# Patient Record
Sex: MALE | Race: WHITE | NOT HISPANIC OR LATINO | Employment: OTHER | URBAN - NONMETROPOLITAN AREA
[De-identification: names, ages, dates, MRNs, and addresses within clinical notes are randomized per-mention and may not be internally consistent; named-entity substitution may affect disease eponyms.]

---

## 2020-02-11 ENCOUNTER — OFFICE VISIT CONVERTED (OUTPATIENT)
Dept: FAMILY MEDICINE CLINIC | Age: 60
End: 2020-02-11
Attending: FAMILY MEDICINE

## 2020-02-21 ENCOUNTER — HOSPITAL ENCOUNTER (OUTPATIENT)
Dept: OTHER | Facility: HOSPITAL | Age: 60
Discharge: HOME | End: 2020-02-21
Attending: FAMILY MEDICINE

## 2020-02-21 ENCOUNTER — CONVERSION ENCOUNTER (OUTPATIENT)
Dept: CARDIOLOGY | Facility: CLINIC | Age: 60
End: 2020-02-21
Attending: INTERNAL MEDICINE

## 2020-02-21 LAB
ALBUMIN SERPL-MCNC: 3.9 G/DL (ref 3.5–5)
ALBUMIN/GLOB SERPL: 1.1 {RATIO} (ref 1.4–2.6)
ALP SERPL-CCNC: 75 U/L (ref 56–119)
ALT SERPL-CCNC: 20 U/L (ref 10–40)
ANION GAP SERPL CALC-SCNC: 27 MMOL/L (ref 8–19)
AST SERPL-CCNC: 26 U/L (ref 15–50)
BASOPHILS # BLD MANUAL: 0.08 10*3/UL (ref 0–0.2)
BASOPHILS NFR BLD MANUAL: 0.8 % (ref 0–3)
BILIRUB SERPL-MCNC: 0.45 MG/DL (ref 0.2–1.3)
BUN SERPL-MCNC: 13 MG/DL (ref 5–25)
BUN/CREAT SERPL: 12 {RATIO} (ref 6–20)
CALCIUM SERPL-MCNC: 9.6 MG/DL (ref 8.7–10.4)
CHLORIDE SERPL-SCNC: 96 MMOL/L (ref 99–111)
CHOLEST SERPL-MCNC: 211 MG/DL (ref 107–200)
CHOLEST/HDLC SERPL: 5.9 {RATIO} (ref 3–6)
CONV CO2: 21 MMOL/L (ref 22–32)
CONV TOTAL PROTEIN: 7.3 G/DL (ref 6.3–8.2)
CREAT UR-MCNC: 1.09 MG/DL (ref 0.7–1.2)
DEPRECATED RDW RBC AUTO: 47.6 FL
EOSINOPHIL # BLD MANUAL: 0.26 10*3/UL (ref 0–0.7)
EOSINOPHIL NFR BLD MANUAL: 2.7 % (ref 0–7)
ERYTHROCYTE [DISTWIDTH] IN BLOOD BY AUTOMATED COUNT: 16.6 % (ref 11.5–14.5)
GFR SERPLBLD BASED ON 1.73 SQ M-ARVRAT: >60 ML/MIN/{1.73_M2}
GLOBULIN UR ELPH-MCNC: 3.4 G/DL (ref 2–3.5)
GLUCOSE SERPL-MCNC: 91 MG/DL (ref 70–99)
GRANS (ABSOLUTE): 6.41 10*3/UL (ref 2–8)
GRANS: 66.9 % (ref 30–85)
HBA1C MFR BLD: 14.7 G/DL (ref 14–18)
HCT VFR BLD AUTO: 46.9 % (ref 42–52)
HDLC SERPL-MCNC: 36 MG/DL (ref 40–60)
IMM GRANULOCYTES # BLD: 0.01 10*3/UL (ref 0–0.54)
IMM GRANULOCYTES NFR BLD: 0.1 % (ref 0–0.43)
LDLC SERPL CALC-MCNC: 130 MG/DL (ref 70–100)
LYMPHOCYTES # BLD MANUAL: 2.35 10*3/UL (ref 1–5)
LYMPHOCYTES NFR BLD MANUAL: 5 % (ref 3–10)
MCH RBC QN AUTO: 25.5 PG (ref 27–31)
MCHC RBC AUTO-ENTMCNC: 31.3 G/DL (ref 33–37)
MCV RBC AUTO: 81.4 FL (ref 80–96)
MONOCYTES # BLD AUTO: 0.48 10*3/UL (ref 0.2–1.2)
OSMOLALITY SERPL CALC.SUM OF ELEC: 290 MOSM/KG (ref 273–304)
PLATELET # BLD AUTO: 326 10*3/UL (ref 130–400)
PMV BLD AUTO: 8.4 FL (ref 7.4–10.4)
POTASSIUM SERPL-SCNC: 4.3 MMOL/L (ref 3.5–5.3)
PSA SERPL-MCNC: 0.59 NG/ML (ref 0–4)
RBC # BLD AUTO: 5.76 10*6/UL (ref 4.7–6.1)
SODIUM SERPL-SCNC: 140 MMOL/L (ref 135–147)
TRIGL SERPL-MCNC: 223 MG/DL (ref 40–150)
TSH SERPL-ACNC: 3.5 M[IU]/L (ref 0.27–4.2)
VARIANT LYMPHS NFR BLD MANUAL: 24.5 % (ref 20–45)
VLDLC SERPL-MCNC: 45 MG/DL (ref 5–37)
WBC # BLD AUTO: 9.59 10*3/UL (ref 4.8–10.8)

## 2020-06-02 ENCOUNTER — OFFICE VISIT CONVERTED (OUTPATIENT)
Dept: FAMILY MEDICINE CLINIC | Age: 60
End: 2020-06-02
Attending: FAMILY MEDICINE

## 2020-10-28 ENCOUNTER — HOSPITAL ENCOUNTER (OUTPATIENT)
Dept: OTHER | Facility: HOSPITAL | Age: 60
Discharge: HOME | End: 2020-10-28
Attending: FAMILY MEDICINE

## 2020-10-30 LAB — SARS-COV-2 RNA SPEC QL NAA+PROBE: NOT DETECTED

## 2020-12-01 ENCOUNTER — OFFICE VISIT CONVERTED (OUTPATIENT)
Dept: FAMILY MEDICINE CLINIC | Age: 60
End: 2020-12-01
Attending: FAMILY MEDICINE

## 2020-12-29 ENCOUNTER — OFFICE VISIT CONVERTED (OUTPATIENT)
Dept: FAMILY MEDICINE CLINIC | Age: 60
End: 2020-12-29
Attending: FAMILY MEDICINE

## 2021-05-18 NOTE — PROGRESS NOTES
Rusty Benitez  1960     Office/Outpatient Visit    Visit Date: Tue, Dec 29, 2020 09:01 am    Provider: Jonh Mir MD (Assistant: Hedy Titus MA)    Location: [facility]        Electronically signed by oJnh Mir MD on  12/29/2020 10:47:29 AM                             Subjective:        CC: [phone] - DOX VIDEO NOT TAKING BREO, PT STATES IT WAS TOO EXPENSIVE, ONLY TAKING CYMBALTA ONCE A DAYStera is a 60 year old White male.  This is a follow-up visit.  discuss medication, wants to increase dosage of cymbalta, once a day not working Today's encounter is being done with a telehealth visit. He has consented verbally with two witnesses for todays treatment. Todays visit is being conducted by audio and video. Individuals present during the telemedicine consultation include patient and Dr. Mir         HPI:       Pertaining to depression, at last visit, Jimmie was started on Cymbalta 30 mg daily. He reports that this medication has been helpful. However, he has continued to struggle intermittently with irritability and wonders if he can increase his dose..  He has previously tried and failed celexa and wellbutrin. He denies SI or HI.          Pertaining to COPD, Jimmie reports that his symptoms are stable.  His current regimen is  albuterol as needed. He was prescribed breo ellipta at last visit and was prescribed symbicort in the past but is not taking either of these due to prohibitive cost.  Today, he endorses baseline shortness of breath and nonproductive cough. He has no fever or chills.  He continues to smoke.              With regard to the hyperlipidemia, unspecified, current treatment includes a low cholesterol/low fat diet.  Compliance with treatment has been good; he maintains his low cholesterol diet and follows up as directed.  He denies experiencing any hypercholesterolemia related symptoms.  Most recent lab tests include Total Cholesterol:  211 (mg/dL)  "(02/21/2020), HDL:  36 (mg/dL) (02/21/2020), Triglycerides:  223 (mg/dL) (02/21/2020), LDL:  130 (mg/dL) (02/21/2020).      ROS:     CONSTITUTIONAL:  Positive for fatigue.   Negative for chills or fever.      EYES:  Negative for blurred vision.      CARDIOVASCULAR:  Negative for chest pain, dizziness, palpitations and edema.      RESPIRATORY:  Positive for dyspnea, sleep apnea and cough.      GASTROINTESTINAL:  Negative for abdominal pain, constipation, diarrhea, heartburn, nausea and vomiting.      INTEGUMENTARY:  Negative for rash.      NEUROLOGICAL:  Negative for headaches, paresthesias and weakness.      PSYCHIATRIC:  Positive for depression, anhedonia and sleep disturbance.   Negative for suicidal thoughts.          Past Medical History / Family History / Social History:         Last Reviewed on 12/29/2020 10:47 AM by Jonh Mir    Past Medical History:             PAST MEDICAL HISTORY         Positive for    COPD and    Sleep Apnea;     Positive for    Depression;         PREVENTIVE HEALTH MAINTENANCE             BONE DENSITY: has never been done     COLORECTAL CANCER SCREENING: declines colorectal cancer screening, understands reason for testing     PSA: was last done 02- with normal results         Surgical History:         Positive for    Cholecystectomy;     Positive for    left meniscal repair; and    \"triple P' sinus surgery;;         Family History:         Positive for Pancreatric Cancer;     Positive for Depression;         Social History:     Occupation:    Disabled     Marital Status:      Children: 1 child         Tobacco/Alcohol/Supplements:     Last Reviewed on 12/29/2020 10:47 AM by Jonh Mir    Tobacco: Current Smoker: He currently smokes every day, 1 pack per day.          Substance Abuse History:     Last Reviewed on 12/29/2020 10:47 AM by Jonh Mir    None         Mental Health History:     Last Reviewed on 12/29/2020 10:47 AM by Jonh Mir        Major Depression " "        Communicable Diseases (eg STDs):     Last Reviewed on 12/29/2020 10:47 AM by Jonh Mir    Reportable health conditions; NEGATIVE         Current Problems:     Last Reviewed on 12/29/2020 10:47 AM by Jonh Mir    Major depressive disorder, recurrent, unspecified    Sleep apnea, unspecified    Chronic obstructive pulmonary disease, unspecified    Dyspnea, unspecified    Encounter for screening for malignant neoplasm of colon    Encounter for screening for malignant neoplasm of prostate    Encounter for screening for depression    Localized edema    Shortness of breath    Encounter for general adult medical examination without abnormal findings    Hyperlipidemia, unspecified    Encounter for immunization        Immunizations:     influenza, injectable, quadrivalent, preservative free (FLUZONE QUAD [id number]) 10/8/2020        Allergies:     Last Reviewed on 12/29/2020 10:47 AM by Jonh Mir    No Known Allergies.        Current Medications:     Last Reviewed on 12/29/2020 10:47 AM by Jonh Mir    No Known Medications.    albuterol sulfate 90 mcg/actuation Inhalation HFA Aerosol Inhaler [inhale 1 - 2 puffs (90 - 180 mcg) by inhalation route every 4 hours as needed]    furosemide 40 mg oral tablet [TAKE 1 TABLET(40 MG) BY MOUTH DAILY]    diclofenac sodium 75 mg oral tablet, delayed release (enteric coated) [take 1 tablet (75 mg) by oral route 2 times per day]    DULoxetine 30 mg oral capsule,delayed release (enteric coated) [take 1 capsule (30 mg) by oral route 2 times per day]    Breo Ellipta 200-25 mcg/dose Inhalation Blister, With Inhalation Device [inhale 1 puff by inhalation route once daily ]        Objective:        Exams:     PHYSICAL EXAM:     GENERAL: Vitals recorded well developed, well nourished;  no apparent distress;     EYES: conjunctiva and cornea are normal;     RESPIRATORY: normal respiratory rate and pattern with no distress; Clear to auscultation bilateally; no rales (\"crackles\") " present; no rhonchi; no wheezes;     SKIN:  No significant rashes, lesions or suspicious moles within limits of examination;     NEUROLOGIC: Grossly intact; mental status: alert and oriented x 3;     PSYCHIATRIC: appropriate affect and demeanor; normal speech pattern; Normal behavior;         Assessment:         F33.9   Major depressive disorder, recurrent, unspecified       J44.9   Chronic obstructive pulmonary disease, unspecified       E78.5   Hyperlipidemia, unspecified           ORDERS:         Meds Prescribed:       [Refilled] DULoxetine 60 mg oral capsule,delayed release (enteric coated) [take 1 capsule (60 mg) by oral route once daily], #30 (thirty) capsules, Refills: 1 (one)                 Plan:         Major depressive disorder, recurrent, unspecified- Improved but not completely controlled.  Increase Cymbalta to 60 mg daily.  Return to clinic in 4 weeks.              Prescriptions:       [Refilled] DULoxetine 60 mg oral capsule,delayed release (enteric coated) [take 1 capsule (60 mg) by oral route once daily], #30 (thirty) capsules, Refills: 1 (one)         Chronic obstructive pulmonary disease, unspecified- Stable.  Continue albuterol as needed.  Advised patient to contact insurance provider to obtain guidance on which ICS and LABA would be covered.         Hyperlipidemia, unspecified- Not controlled.  Patient has previously declined medications.  Repeat lipid panel ordered today.            Charge Capture:         Primary Diagnosis:     F33.9  Major depressive disorder, recurrent, unspecified           Orders:      31354  Office/outpatient visit; established patient, level 4  (In-House)              J44.9  Chronic obstructive pulmonary disease, unspecified     E78.5  Hyperlipidemia, unspecified

## 2021-05-18 NOTE — PROGRESS NOTES
"Rusty Benitez  1960     Office/Outpatient Visit    Visit Date: Tue, Dec 1, 2020 01:53 pm    Provider: Jonh Mir MD (Assistant: Mira Lopez MA)    Location: [facility]        Electronically signed by Jonh Mir MD on  12/01/2020 06:48:29 PM                             Subjective:        CC: Jimmie is a 59 year old White male.  Follow up with medicaiton refills. [facility] [phone] Today's encounter is being done with a telehealth visit. He has consented verbally with two witnesses for todays treatment. Todays visit is being conducted by audio and video. Individuals present during the telemedicine consultation include patient and Dr. Mir         HPI:       Pertaining to depression, At last visit, Jimmie was started on wellbutrin 150 mg daily. He reports that this medication has been unhelpful. He reports that he continues to struggle with poor mood, anhedonia, and fatigue.  He has previously tried and failed celexa. He denies SI or HI.      Pertaining to sleep apnea, patient reports that he was told that he needed positive pressure airway in the past but he could not tolerate the mask.  After an ENT evaluation he had a \"triple P\" sinus surgery that he feels was ultimately unsuccessful.  He continues to endorse daytime sleepiness and fatigue.  Stop bang score is 7. However, at a previous visit, he declined referral for repeat sleep study and continues to do so today.      Pertaining to COPD, patient reports that his symptoms are stable.  His current regimen is symbicort and albuterol as needed. However, he reports that he has not had symbicort recently due to cost.  Today, he endorses baseline shortness of breath and nonproductive cough he has no fever or chills.  He continues to smoke.          Dx with hyperlipidemia, unspecified; current treatment includes a low cholesterol/low fat diet.  Compliance with treatment has been good; he maintains his low cholesterol diet and follows " "up as directed.  He denies experiencing any hypercholesterolemia related symptoms.  Most recent lab tests include Total Cholesterol:  211 (mg/dL) (02/21/2020), HDL:  36 (mg/dL) (02/21/2020), Triglycerides:  223 (mg/dL) (02/21/2020), LDL:  130 (mg/dL) (02/21/2020).      ROS:     CONSTITUTIONAL:  Positive for fatigue.   Negative for chills or fever.      EYES:  Negative for blurred vision.      CARDIOVASCULAR:  Negative for chest pain, dizziness, palpitations and edema.      RESPIRATORY:  Positive for dyspnea, sleep apnea and cough.      GASTROINTESTINAL:  Negative for abdominal pain, constipation, diarrhea, heartburn, nausea and vomiting.      INTEGUMENTARY:  Negative for rash.      NEUROLOGICAL:  Negative for headaches, paresthesias and weakness.      PSYCHIATRIC:  Positive for depression, anhedonia and sleep disturbance.   Negative for suicidal thoughts.          Past Medical History / Family History / Social History:         Last Reviewed on 12/01/2020 06:48 PM by Jonh Mir    Past Medical History:             PAST MEDICAL HISTORY         Positive for    COPD and    Sleep Apnea;     Positive for    Depression;         PREVENTIVE HEALTH MAINTENANCE             BONE DENSITY: has never been done     COLORECTAL CANCER SCREENING: declines colorectal cancer screening, understands reason for testing     PSA: was last done 02- with normal results         Surgical History:         Positive for    Cholecystectomy;     Positive for    left meniscal repair; and    \"triple P' sinus surgery;;         Family History:         Positive for Pancreatric Cancer;     Positive for Depression;         Social History:     Occupation:    Disabled     Marital Status:      Children: 1 child         Tobacco/Alcohol/Supplements:     Last Reviewed on 12/01/2020 06:48 PM by Jonh Mir    Tobacco: Current Smoker: He currently smokes every day, 1 pack per day.          Substance Abuse History:     Last Reviewed on 12/01/2020 " 06:48 PM by Jonh Mir    None         Mental Health History:     Last Reviewed on 12/01/2020 06:48 PM by Jonh Mir        Major Depression         Communicable Diseases (eg STDs):     Last Reviewed on 12/01/2020 06:48 PM by Jonh Mir    Reportable health conditions; NEGATIVE         Current Problems:     Last Reviewed on 12/01/2020 06:48 PM by Jonh Mir    Major depressive disorder, recurrent, unspecified    Sleep apnea, unspecified    Chronic obstructive pulmonary disease, unspecified    Dyspnea, unspecified    Encounter for screening for malignant neoplasm of colon    Encounter for screening for malignant neoplasm of prostate    Encounter for screening for depression    Localized edema    Shortness of breath    Encounter for general adult medical examination without abnormal findings    Hyperlipidemia, unspecified    Encounter for immunization        Immunizations:     influenza, injectable, quadrivalent, preservative free (FLUZONE QUAD [id number]) 10/8/2020        Allergies:     Last Reviewed on 12/01/2020 06:48 PM by Jonh Mir    No Known Allergies.        Current Medications:     Last Reviewed on 12/01/2020 06:48 PM by Jonh Mir    No Known Medications.    albuterol sulfate 90 mcg/actuation Inhalation HFA Aerosol Inhaler [inhale 1 - 2 puffs (90 - 180 mcg) by inhalation route every 4 hours as needed]    furosemide 40 mg oral tablet [TAKE 1 TABLET(40 MG) BY MOUTH DAILY]    diclofenac sodium 75 mg oral tablet, delayed release (enteric coated) [take 1 tablet (75 mg) by oral route 2 times per day]    budesonide-formoterol 160-4.5 mcg/actuation Inhalation HFA Aerosol Inhaler [inhale 1 puffs by inhalation route 2 times per day in the morning andevening]        Objective:        Exams:     PHYSICAL EXAM:     GENERAL: Vitals recorded well developed, well nourished;  no apparent distress;     EYES: conjunctiva and cornea are normal;     RESPIRATORY: normal respiratory rate and pattern with no  distress;     SKIN:  No significant rashes, lesions or suspicious moles within limits of examination;     NEUROLOGIC: Grossly intact; mental status: alert and oriented x 3;     PSYCHIATRIC: appropriate affect and demeanor; normal speech pattern; Normal behavior;         Assessment:         F33.9   Major depressive disorder, recurrent, unspecified       G47.30   Sleep apnea, unspecified       J44.9   Chronic obstructive pulmonary disease, unspecified       E78.5   Hyperlipidemia, unspecified           ORDERS:         Meds Prescribed:       [New Rx] DULoxetine 30 mg oral capsule,delayed release (enteric coated) [take 1 capsule (30 mg) by oral route 2 times per day], #30 (thirty) capsules, Refills: 1 (one)         Lab Orders:       [id number]  [facility] Comp. Metabolic Panel  (Send-Out)            [id number]  [facility] Lipid Panel  (Send-Out)                      Plan:         Major depressive disorder, recurrent, unspecified- Not controlled.  Discontinue Wellbutrin due to lack of efficacy.  Start Cymbalta 30 mg daily.  Return to clinic in 4 weeks.          Prescriptions:       [New Rx] DULoxetine 30 mg oral capsule,delayed release (enteric coated) [take 1 capsule (30 mg) by oral route 2 times per day], #30 (thirty) capsules, Refills: 1 (one)         Sleep apnea, unspecified-High risk for obstructive sleep apnea given STOP-BANG score of 7.  Jimmie continues to decline referral for sleep study.        Chronic obstructive pulmonary disease, unspecified- Stable.  Continue albuterol as needed.  Will attempt to restart Symbicort as patient notes that he is near his deductible and this should be avoided.  However,if  the cost remains prohibitive will start equivalent medication such as Breo Ellipta.        Hyperlipidemia, unspecified- Not controlled.  Patient has previously declined medications.  Repeat lipid panel ordered today.    LABORATORY:  Labs ordered to be performed today include Comprehensive metabolic panel and lipid panel.             Orders:       [id number]  [facility] Comp. Metabolic Panel  (Send-Out)            [id number]  [facility] Lipid Panel  (Send-Out)                  Charge Capture:         Primary Diagnosis:     F33.9  Major depressive disorder, recurrent, unspecified           Orders:      [id number]  Office/outpatient visit; established patient, level 4  (In-House)              G47.30  Sleep apnea, unspecified     J44.9  Chronic obstructive pulmonary disease, unspecified     E78.5  Hyperlipidemia, unspecified         ADDENDUMS:      ____________________________________    Addendum: 12/02/2020 10:49 AM - Mira Hollingsworth        patient informed that the instructions default to BID but he to take medication, Duloxetine/Cymbalta 30mg ONCE per day. Pt voiced understanding./bb

## 2021-05-18 NOTE — PROGRESS NOTES
"Rusty Benitez  1960     Office/Outpatient Visit    Visit Date: Tue, Feb 11, 2020 11:34 am    Provider: Jonh Mir MD (Assistant: Spurling, Sarah C, MA)    Location: [facility]        Electronically signed by Jonh Mir MD on  02/11/2020 06:49:16 PM                             Subjective:        CC: Jimmie is a 59 year old White male.  This is his first visit to the clinic.  est. care. & he is here for both of his feet swelling, and for his COPD. He is currently not on any medications.  *WANTS HIS FLU SHOT AS LONG AS MEDICARE WILL PAY FOR IT**         HPI:           PHQ-9 Depression Screening: Completed form scanned and in chart; Total Score 21       Patient reports that for approximately the last 2 months he has been experiencing bilateral lower extremity edema.  He believes this is gradually worsening.  He has no known history of congestive heart failure, blood clots or lymphedema.  No prior history of trauma to either leg.  He has been trying to keep his legs elevated and minimize long periods of time spent on his feet.  He denies chest pain, palpitations, orthopnea.  He does endorse shortness of breath that he said has been present for longer than his edema.      Patient reports a past medical history of depression for which he previously was on medication.  He is not certain of the name of this medication.  He said that he stopped taking it several months ago due to lack of insurance coverage.  Today, he says that he thinks he needs to be restarted on a medication as his mood is been very down.  He also endorses anhedonia fatigue and feelings of guilt or worthlessness.  He denies suicidal or homicidal ideation.      Patient reports that he was diagnosed with sleep apnea in the past.  He was told that he would need positive pressure airway therapy but he says that he could not tolerate the mask.  He said he was evaluated by an ENT who performed a \"triple P\" surgery help to " "improve sleep apnea.  He says he feels like the surgery was unsuccessful and possibly even botched.  He endorses consistent daytime sleepiness and fatigue.  He also reports that he has multiple nighttime awakenings.  Stop bang score is 7.      Patient reports a longstanding history of COPD.  He said he has had pulmonary function testing but cannot remember when or where.  He does not follow with pulmonology.  He has not been on medication \"for a while.\"  His previous regimen includes Symbicort daily and albuterol as needed.  He endorses chronic shortness of breath and nonproductive cough.  He becomes short of breath with more than mild exertion. No fever or chills.  Bilateral lower extremity edema as noted above.      Patient is overdue for colon cancer screening and is willing to be referred for colonoscopy today      Patient is overdue for prostate cancer screening and is willing to have a PSA drawn today.    ROS:     CONSTITUTIONAL:  Positive for fatigue.   Negative for chills or fever.      EYES:  Negative for blurred vision.      E/N/T:  Negative for ear pain and tinnitus.      CARDIOVASCULAR:  Positive for edema.   Negative for chest pain, dizziness or palpitations.      RESPIRATORY:  Positive for dyspnea, sleep apnea and cough.      GASTROINTESTINAL:  Negative for abdominal pain, constipation, diarrhea, heartburn, nausea and vomiting.      GENITOURINARY:  Negative for dysuria, hematuria and polyuria.      MUSCULOSKELETAL:  Negative for arthralgias and myalgias.      INTEGUMENTARY:  Negative for rash.      NEUROLOGICAL:  Negative for headaches, paresthesias and weakness.      HEMATOLOGIC/LYMPHATIC:  Negative for easy bruising and excessive bleeding.      ENDOCRINE:  Negative for hair loss, heat/cold intolerance, polydipsia, and polyphagia.      PSYCHIATRIC:  Positive for depression, anhedonia and sleep disturbance.   Negative for suicidal thoughts.          Past Medical History / Family History / Social " "History:         Last Reviewed on 2/11/2020 06:48 PM by Jonh Mir    Past Medical History:             PAST MEDICAL HISTORY         Positive for    COPD and    Sleep Apnea;     Positive for    Depression;         Surgical History:         Positive for    Cholecystectomy;     Positive for    left meniscal repair; and    \"triple P' sinus surgery;;         Family History:         Positive for Pancreatric Cancer;     Positive for Depression;         Social History:     Occupation:    Disabled     Marital Status:      Children: 1 child         Tobacco/Alcohol/Supplements:     Last Reviewed on 2/11/2020 06:48 PM by Jonh Mir    Tobacco: Current Smoker: He currently smokes every day, 1 pack per day.          Substance Abuse History:     Last Reviewed on 2/11/2020 06:48 PM by Jonh Mir    None         Mental Health History:     Last Reviewed on 2/11/2020 06:48 PM by Jonh Mir        Major Depression         Communicable Diseases (eg STDs):     Last Reviewed on 2/11/2020 06:48 PM by Jonh Mir    Reportable health conditions; NEGATIVE         Current Problems:     Last Reviewed on 2/11/2020 06:48 PM by Jonh Mir    Major depressive disorder, recurrent, unspecified    Sleep apnea, unspecified    Chronic obstructive pulmonary disease, unspecified    Dyspnea, unspecified    Localized edema    Encounter for screening for malignant neoplasm of colon    Encounter for screening for malignant neoplasm of prostate    Encounter for screening for depression        Immunizations:     None        Allergies:     Last Reviewed on 2/11/2020 06:48 PM by Jonh Mir    No Known Allergies.        Current Medications:     Last Reviewed on 2/11/2020 06:48 PM by Jonh Mir    No Known Medications.    furosemide 40 mg oral tablet [take 1 tablet (40 mg) by oral route daily]    budesonide-formoterol 160-4.5 mcg/actuation Inhalation HFA Aerosol Inhaler [inhale 1 puffs by inhalation route 2 times per day in the " "morning andevening]    albuterol sulfate 90 mcg/actuation Inhalation HFA Aerosol Inhaler [inhale 1 - 2 puffs (90 - 180 mcg) by inhalation route every 4 hours as needed]        Objective:        Vitals:         Current: 2/11/2020 11:40:51 AM    Ht:  6 ft, 0 in;  Wt: 325.2 lbs;  BMI: 44.1T: 98.6 F (oral);  BP: 130/69 mm Hg (left arm, sitting);  P: 90 bpm (left arm (BP Cuff), sitting)O2 Sat: 96 % (room air)        Exams:     PHYSICAL EXAM:     GENERAL: Vitals recorded well developed, well nourished;  no apparent distress;     EYES: conjunctiva and cornea are normal;     E/N/T:  normal EACs, TMs, nasal/oral mucosa, teeth, gingiva, and oropharynx;     NECK: trachea is midline; thyroid is non-palpable;     RESPIRATORY: Clear to auscultation bilateally; no rales (\"crackles\") present; no rhonchi; no wheezes;     CARDIOVASCULAR: normal rate; rhythm is regular;  No murmurs, clicks, gallops or rubs appreciated; 2+ pedal edema;     GASTROINTESTINAL: nontender; Soft and nondistended; normal bowel sounds; no organomegaly; no masses;     LYMPHATIC: no enlargement of cervical or facial nodes; no supraclavicular nodes;     SKIN:  No significant rashes, lesions or suspicious moles within limits of examination;     MUSCULOSKELETAL: muscle strength: 5/5 in all major muscle groups;  normal overall tone     NEUROLOGIC: Grossly intact; mental status: alert and oriented x 3;     PSYCHIATRIC: appropriate affect and demeanor; normal speech pattern; Normal behavior;         Assessment:         R60.0   Localized edema       F33.9   Major depressive disorder, recurrent, unspecified       G47.30   Sleep apnea, unspecified       J44.9   Chronic obstructive pulmonary disease, unspecified       Z12.11   Encounter for screening for malignant neoplasm of colon       Z12.5   Encounter for screening for malignant neoplasm of prostate       R06.00   Dyspnea, unspecified       Z13.31   Encounter for screening for depression           ORDERS:         " Radiology/Test Orders:       [id number]  Echocardiography, transthoracic, real-time w image (2D), w M-mode, w spectral & color flow Doppler  (Send-Out)              Lab Orders:       [id number]  [facility] Comp. Metabolic Panel  (Send-Out)            [id number]  [facility] Lipid Panel  (Send-Out)            [id number]  [facility] TSH  (Send-Out)            [id number]  [facility]  (Send-Out)            *  [employer] screening PSA  (Send-Out)              Procedures Ordered:       REFER  Referral to Specialist or Other Facility  (Send-Out)              Other Orders:         Depression screen positive and follow up plan documented  (In-House)                      Plan:         Localized edema- Venous stasis versus lymphedema versus hypoproteinemia versus congestive heart failure.  Will order an echo for further evaluation.  Labs also ordered including CBC, lipid panel and TSH.  Will start Lasix 40 mg daily. Advised patient to obtain a pair of compression stockings and wear them as often as tolerated.  Keep legs elevated as much as possible.  If symptoms persist, will consider increasing diuretic dose as allowed and/or referral for lymphedema wraps.    LABORATORY:  Labs ordered to be performed today include Comprehensive metabolic panel, lipid panel, and TSH.      RADIOLOGY:  I have ordered ECHO to be done today.            Orders:       [id number]  [facility] Comp. Metabolic Panel  (Send-Out)            [id number]  [facility] Lipid Panel  (Send-Out)            [id number]  [facility] TSH  (Send-Out)            [id number]  Echocardiography, transthoracic, real-time w image (2D), w M-mode, w spectral & color flow Doppler  (Send-Out)              Major depressive disorder, recurrent, unspecified- Not controlled.  Start Celexa.  Take 10 mg daily for 1 week then increase to 20 mg daily.  Return to clinic in 4 weeks for mood med check        Sleep apnea, unspecified- Stop bang score of 7 is consistent with high risk of obstructive sleep  apnea.  Patient was previously diagnosed and had positive airway therapy recommended.  He declines referral for a repeat sleep study today.  He would like to see if treating his depression helps his sleep any.  If not, will readdress referral for sleep study at next visit.    LABORATORY:  Labs ordered to be performed today include CBC.            Orders:       [id number]  [facility] CBC with 3 part diff  (Send-Out)              Chronic obstructive pulmonary disease, unspecified- Not controlled.  Will restart albuterol as needed and Symbicort BID. Patient will also likely need repeat PFTs in the near future.        Encounter for screening for malignant neoplasm of colon- Colonoscopy referral placed        REFERRALS:  Referral initiated to a general surgeon ( a colonoscopy ).            Orders:       REFER  Referral to Specialist or Other Facility  (Send-Out)              Encounter for screening for malignant neoplasm of prostate- PSA ordered    LABORATORY:  Labs ordered to be performed today include PSA.            Orders:       *  [employer] screening PSA  (Send-Out)              Dyspnea, unspecified- See COPD and edema plans above        Encounter for screening for depression    MIPS PHQ-9 Depression Screening: Completed form scanned and in chart; Total Score 21 Positive Depression Screen: Pharmacologic intervention initiated/modified           Orders:         Depression screen positive and follow up plan documented  (In-House)                  Charge Capture:         Primary Diagnosis:     R60.0  Localized edema           Orders:      [id number]  Office visit - new pt, level 3  (In-House)              F33.9  Major depressive disorder, recurrent, unspecified     G47.30  Sleep apnea, unspecified     J44.9  Chronic obstructive pulmonary disease, unspecified     Z12.11  Encounter for screening for malignant neoplasm of colon     Z12.5  Encounter for screening for malignant neoplasm of prostate     R06.00   Dyspnea, unspecified     Z13.31  Encounter for screening for depression           Orders:        Depression screen positive and follow up plan documented  (In-House)

## 2021-05-18 NOTE — PROGRESS NOTES
Rusty Benitez  1960     Office/Outpatient Visit    Visit Date: Tue, Jun 2, 2020 12:59 pm    Provider: Jonh Mir MD (Assistant: Andreina Powell MA)    Location: [facility]        Electronically signed by Jonh Mir MD on  08/19/2020 07:33:18 PM                             Subjective:        CC: Jimmie is a 59 year old White male.  Today's encounter is being done with a telehealth visit. He has consented verbally with two witnesses for todays treatment. Todays visit is being conducted by audio and video. Individuals present during the telemedicine consultation include patient an Dr. Mir         HPI:           Jimmie is here for a Medicare wellness visit.          Self-Assessment of Health: He rates his health as poor. He rates his confidence of being able to control/manage most of his health problems as not very confident. His physical/emotional health has limited his social activites extremely.  A review of possible cognitive impairment was performed and the following was noted: he is not having trouble driving;  memory changes are noted;  he is not having trouble with his finances A review of functional ability, including bathing, dressing, walking, and urine/bowel continence as well as level of safety was performed and was found to be negative.  Falls Risk: Has not had any falls or only one fall without injury in the past year.  In regard to hearing, he reports having trouble hearing the TV/radio when others do not and having to strain to hear or understand conversations, but not wearing hearing aid(s).  Concerning home safety, he reports that at home he DOES have adequate lighting, a skid resistant shower/tub, handrails on stairs and functioning smoke alarms, but not grab bars in the bath or absence of throw rugs.  Physical Activity: He exercises but less than 20 minutes 3 days per week; He never excercises.; Type of diet patient normally eats is described as well-balanced with  "fruits and vegetables Tobacco: Current Smoker: He currently smokes every day, 1 pack per day.  Preventative Health updated today.            PHQ-9 Depression Screening: Completed form scanned and in chart; Total Score 21       Pertaining to depression, Jimmie reports that he continues to struggle with poor mood, anhedonia, fatigue and issues sleeping.  At last visit he was started on Celexa 10 mg daily with instructions to increase to 20 mg after 1 week.  He has since discontinued this medicine citing that he does not like the way that it makes him feel. He denies SI or HI.      Pertaining to sleep apnea, patient reports that he was told that he needed positive pressure airway in the past but he could not tolerate the mask.  After an ENT evaluation he had a \"triple P\" sinus surgery that he feels was ultimately unsuccessful.  He continues to endorse daytime sleepiness and fatigue.  Stop bang score is 7 at last visit, he declined referral for repeat sleep study and continues to do so today.      Pertaining to COPD, patient reports that his symptoms are stable.  He was restarted on Symbicort and albuterol as needed at last visit.  Today, he endorses baseline shortness of breath and nonproductive cough he has no fever or chills.  He continues to smoke.      After last visit, Jimmie was found to have high cholesterol.  It was recommended that he start a statin.  He reports that this message never made to him.  Today, he reports that he would like to have his lipid panel repeated And would like to avoid starting a new medication if at all possible.    ROS:     CONSTITUTIONAL:  Positive for fatigue.   Negative for chills or fever.      EYES:  Negative for blurred vision.      E/N/T:  Negative for ear pain and tinnitus.      CARDIOVASCULAR:  Negative for chest pain, dizziness, palpitations and edema.      RESPIRATORY:  Positive for dyspnea, sleep apnea and cough.      GASTROINTESTINAL:  Negative for abdominal pain, " "constipation, diarrhea, heartburn, nausea and vomiting.      GENITOURINARY:  Negative for dysuria, hematuria and polyuria.      MUSCULOSKELETAL:  Negative for arthralgias and myalgias.      INTEGUMENTARY:  Negative for rash.      NEUROLOGICAL:  Negative for headaches, paresthesias and weakness.      HEMATOLOGIC/LYMPHATIC:  Negative for easy bruising and excessive bleeding.      ENDOCRINE:  Negative for hair loss, heat/cold intolerance, polydipsia, and polyphagia.      PSYCHIATRIC:  Positive for depression, anhedonia and sleep disturbance.   Negative for suicidal thoughts.          Past Medical History / Family History / Social History:         Last Reviewed on 8/19/2020 07:32 PM by Jonh Mir    Past Medical History:             PAST MEDICAL HISTORY         Positive for    COPD and    Sleep Apnea;     Positive for    Depression;         PREVENTIVE HEALTH MAINTENANCE             BONE DENSITY: has never been done     COLORECTAL CANCER SCREENING: declines colorectal cancer screening, understands reason for testing     PSA: was last done 02- with normal results         Surgical History:         Positive for    Cholecystectomy;     Positive for    left meniscal repair; and    \"triple P' sinus surgery;;         Family History:         Positive for Pancreatric Cancer;     Positive for Depression;         Social History:     Occupation:    Disabled     Marital Status:      Children: 1 child         Tobacco/Alcohol/Supplements:     Last Reviewed on 8/19/2020 07:32 PM by Jonh Mir    Tobacco: Current Smoker: He currently smokes every day, 1 pack per day.          Substance Abuse History:     Last Reviewed on 8/19/2020 07:32 PM by Jonh Mir    None         Mental Health History:     Last Reviewed on 8/19/2020 07:32 PM by Jonh Mir        Major Depression         Communicable Diseases (eg STDs):     Last Reviewed on 8/19/2020 07:32 PM by Jonh Mir    Reportable health conditions; NEGATIVE        "  Current Problems:     Last Reviewed on 8/19/2020 07:32 PM by Jonh Mir    Major depressive disorder, recurrent, unspecified    Sleep apnea, unspecified    Chronic obstructive pulmonary disease, unspecified    Dyspnea, unspecified    Localized edema    Encounter for screening for malignant neoplasm of colon    Encounter for screening for malignant neoplasm of prostate    Encounter for screening for depression    Hyperlipidemia, unspecified    Encounter for general adult medical examination without abnormal findings        Immunizations:     None        Allergies:     Last Reviewed on 8/19/2020 07:32 PM by Jonh Mir    No Known Allergies.        Current Medications:     Last Reviewed on 8/19/2020 07:32 PM by Jonh Mir    No Known Medications.    budesonide-formoterol 160-4.5 mcg/actuation Inhalation HFA Aerosol Inhaler [inhale 1 puffs by inhalation route 2 times per day in the morning andevening]    albuterol sulfate 90 mcg/actuation Inhalation HFA Aerosol Inhaler [inhale 1 - 2 puffs (90 - 180 mcg) by inhalation route every 4 hours as needed]    furosemide 40 mg oral tablet [TAKE 1 TABLET(40 MG) BY MOUTH DAILY]    citalopram 20 mg oral tablet [TAKE 1 TABLET(20 MG) BY MOUTH EVERY DAY]    buPROPion  mg oral Tablet, Extended Release 24 hr [TAKE 1 TABLET(150 MG) BY MOUTH TWICE DAILY]        Objective:        Exams:     PHYSICAL EXAM:     GENERAL: Vitals recorded well developed, well nourished;  no apparent distress;     EYES: conjunctiva and cornea are normal;     RESPIRATORY: normal respiratory rate and pattern with no distress;     SKIN:  No significant rashes, lesions or suspicious moles within limits of examination;     NEUROLOGIC: mental status: alert and oriented x 3; Grossly intact;     PSYCHIATRIC: appropriate affect and demeanor; normal speech pattern; Normal behavior;         Assessment:         Z00.00   Encounter for general adult medical examination without abnormal findings       Z13.31    Encounter for screening for depression       F33.9   Major depressive disorder, recurrent, unspecified       G47.30   Sleep apnea, unspecified       J44.9   Chronic obstructive pulmonary disease, unspecified       E78.5   Hyperlipidemia, unspecified           ORDERS:         Meds Prescribed:       [New Rx] buPROPion  mg oral Tablet, Extended Release 24 hr [take 1 tablet (150 mg) by oral route twice daily], #60 (sixty) tablets, Refills: 1 (one)         Lab Orders:       FUTURE  Future order to be done at patients convenience  (Send-Out)            54052  [facility] Lipid Panel  (Send-Out)              Procedures Ordered:         Annual wellness visit, includes a PPPS, subsequent visit  (In-House)              Other Orders:       1101F  Pt screen for fall risk; document no falls in past year or only 1 fall w/o injury in past year (MARE)  (In-House)            1124F  Advance Care Planning discussed and doc in MR; no surrogate named or advance care plan provided  (Send-Out)              Depression screen positive and follow up plan documented  (In-House)                      Plan:         Encounter for general adult medical examination without abnormal findings- Appropriate screenings and vaccinations were reviewed with the patient and offered as indicated.  Patient counseled on healthy lifestyle including balanced diet and adequate physical activity.    MIPS Has had no falls or only one fall without injury in the past year Advance Directive/Surrogate Decision Maker discussed and pt declines to complete today  Smoking cessation encouraged. Counseling for less than 3 minutes.  ADVANCED DIRECTIVES: None               Orders:       1101F  Pt screen for fall risk; document no falls in past year or only 1 fall w/o injury in past year (MARE)  (In-House)            1124F  Advance Care Planning discussed and doc in MR; no surrogate named or advance care plan provided  (Send-Out)              Annual wellness  visit, includes a PPPS, subsequent visit  (In-House)              Encounter for screening for depression    MIPS PHQ-9 Depression Screening: Completed form scanned and in chart; Total Score 21 Positive Depression Screen: Pharmacologic intervention initiated/modified           Orders:         Depression screen positive and follow up plan documented  (In-House)              Major depressive disorder, recurrent, unspecified- Not controlled.  Will discontinue Celexa and start Wellbutrin 150 mg daily.  Return to clinic in 4 weeks for follow-up.          Prescriptions:       [New Rx] buPROPion  mg oral Tablet, Extended Release 24 hr [take 1 tablet (150 mg) by oral route twice daily], #60 (sixty) tablets, Refills: 1 (one)         Sleep apnea, unspecified- Stop bang score of 7 is consistent with high risk of SHELL.  Jimmie continues to decline referral for repeat sleep study. Discussed with him the risks of untreated sleep apnea        Chronic obstructive pulmonary disease, unspecified- Stable.  Continue albuterol as needed and Symbicort twice daily.  Once safe to do so, will have patient do PFTs in office        Hyperlipidemia, unspecified- Repeat lipid panel ordered today at patient's request.  He declines initiation of a statin at this time.        FOLLOW-UP TESTING #1: FOLLOW-UP LABORATORY:  Labs to be scheduled in the future include lipid panel.            Orders:       FUTURE  Future order to be done at patients convenience  (Send-Out)            [address]42  [facility] Lipid Panel  (Send-Out)                  Patient Recommendations:        For  Hyperlipidemia, unspecified:            The following laboratory testing has been ordered: lipid panel             Charge Capture:         Primary Diagnosis:     Z00.00  Encounter for general adult medical examination without abnormal findings           Orders:      1101F  Pt screen for fall risk; document no falls in past year or only 1 fall w/o injury in past year (MARE)   (In-House)              Annual wellness visit, includes a PPPS, subsequent visit  (In-House)              Z13.31  Encounter for screening for depression           Orders:      [id number]  Office/outpatient visit; established patient, level 3  (In-House)              Depression screen positive and follow up plan documented  (In-House)              F33.9  Major depressive disorder, recurrent, unspecified     G47.30  Sleep apnea, unspecified     J44.9  Chronic obstructive pulmonary disease, unspecified     E78.5  Hyperlipidemia, unspecified         ADDENDUMS:      ____________________________________    Addendum: 08/25/2020 07:59 AM - Jonh Mir        Please Remove:Z00.00  Encounter for general adult medical examination without abnormal     findings         Orders:    Please Remove:  Annual wellness visit, includes a PPPS, subsequent visit  (In-    House)      **Medicare Wellness could not be billed due to the fact that Welcome to Medicare cannot be done via televideo        Please remove: [id number]  Office/outpatient visit; established patient, level 3  (In-House)    Please Add: [id number]   Office/outpatient visit; established patient, level 4  (In-House)          -kelley

## 2021-06-03 ENCOUNTER — HOSPITAL ENCOUNTER (OUTPATIENT)
Dept: OTHER | Facility: HOSPITAL | Age: 61
Discharge: HOME | End: 2021-06-03
Attending: FAMILY MEDICINE

## 2021-06-03 ENCOUNTER — OFFICE VISIT CONVERTED (OUTPATIENT)
Dept: FAMILY MEDICINE CLINIC | Age: 61
End: 2021-06-03
Attending: FAMILY MEDICINE

## 2021-06-03 LAB
ALBUMIN SERPL-MCNC: 4.1 G/DL (ref 3.5–5)
ALBUMIN/GLOB SERPL: 1.1 {RATIO} (ref 1.4–2.6)
ALP SERPL-CCNC: 86 U/L (ref 56–155)
ALT SERPL-CCNC: 22 U/L (ref 10–40)
ANION GAP SERPL CALC-SCNC: 17 MMOL/L (ref 8–19)
AST SERPL-CCNC: 20 U/L (ref 15–50)
BASOPHILS # BLD MANUAL: 0.06 10*3/UL (ref 0–0.2)
BASOPHILS NFR BLD MANUAL: 0.5 % (ref 0–3)
BILIRUB SERPL-MCNC: 0.24 MG/DL (ref 0.2–1.3)
BUN SERPL-MCNC: 17 MG/DL (ref 5–25)
BUN/CREAT SERPL: 16 {RATIO} (ref 6–20)
CALCIUM SERPL-MCNC: 9.7 MG/DL (ref 8.7–10.4)
CHLORIDE SERPL-SCNC: 99 MMOL/L (ref 99–111)
CHOLEST SERPL-MCNC: 252 MG/DL (ref 107–200)
CHOLEST/HDLC SERPL: 6.5 {RATIO} (ref 3–6)
CONV CO2: 32 MMOL/L (ref 22–32)
CONV TOTAL PROTEIN: 7.8 G/DL (ref 6.3–8.2)
CREAT UR-MCNC: 1.06 MG/DL (ref 0.7–1.2)
DEPRECATED RDW RBC AUTO: 52.9 FL
EOSINOPHIL # BLD MANUAL: 0.36 10*3/UL (ref 0–0.7)
EOSINOPHIL NFR BLD MANUAL: 2.9 % (ref 0–7)
ERYTHROCYTE [DISTWIDTH] IN BLOOD BY AUTOMATED COUNT: 17.1 % (ref 11.5–14.5)
GFR SERPLBLD BASED ON 1.73 SQ M-ARVRAT: >60 ML/MIN/{1.73_M2}
GLOBULIN UR ELPH-MCNC: 3.7 G/DL (ref 2–3.5)
GLUCOSE SERPL-MCNC: 119 MG/DL (ref 70–99)
GRANS (ABSOLUTE): 8.26 10*3/UL (ref 2–8)
GRANS: 66.6 % (ref 30–85)
HBA1C MFR BLD: 15.2 G/DL (ref 14–18)
HCT VFR BLD AUTO: 49.8 % (ref 42–52)
HDLC SERPL-MCNC: 39 MG/DL (ref 40–60)
IMM GRANULOCYTES # BLD: 0.03 10*3/UL (ref 0–0.54)
IMM GRANULOCYTES NFR BLD: 0.2 % (ref 0–0.43)
LDLC SERPL CALC-MCNC: 162 MG/DL (ref 70–100)
LYMPHOCYTES # BLD MANUAL: 3.01 10*3/UL (ref 1–5)
LYMPHOCYTES NFR BLD MANUAL: 5.6 % (ref 3–10)
MCH RBC QN AUTO: 26.7 PG (ref 27–31)
MCHC RBC AUTO-ENTMCNC: 30.5 G/DL (ref 33–37)
MCV RBC AUTO: 87.4 FL (ref 80–96)
MONOCYTES # BLD AUTO: 0.7 10*3/UL (ref 0.2–1.2)
OSMOLALITY SERPL CALC.SUM OF ELEC: 299 MOSM/KG (ref 273–304)
PLATELET # BLD AUTO: 311 10*3/UL (ref 130–400)
PMV BLD AUTO: 9.6 FL (ref 7.4–10.4)
POTASSIUM SERPL-SCNC: 4.6 MMOL/L (ref 3.5–5.3)
RBC # BLD AUTO: 5.7 10*6/UL (ref 4.7–6.1)
SODIUM SERPL-SCNC: 143 MMOL/L (ref 135–147)
TRIGL SERPL-MCNC: 257 MG/DL (ref 40–150)
TSH SERPL-ACNC: 4.44 M[IU]/L (ref 0.27–4.2)
VARIANT LYMPHS NFR BLD MANUAL: 24.2 % (ref 20–45)
VLDLC SERPL-MCNC: 51 MG/DL (ref 5–37)
WBC # BLD AUTO: 12.42 10*3/UL (ref 4.8–10.8)

## 2021-06-04 LAB — T4 FREE SERPL-MCNC: 1.1 NG/DL (ref 0.9–1.8)

## 2021-06-05 NOTE — PROGRESS NOTES
Rusty Benitez  1960     Office/Outpatient Visit    Visit Date: Thu, Selvin 3, 2021 01:19 pm    Provider: Jonh Mir MD (Assistant: Xenia Jovel,  )    Location: [facility]        Electronically signed by Jonh Mir MD on  06/04/2021 05:02:36 PM                             Subjective:        CC: Jimmie is a 60 year old White male.  W         HPI:           Jimmie is here for a Medicare wellness visit.          Self-Assessment of Health: He rates his health as poor. He rates his confidence of being able to control/manage most of his health problems as not very confident. His physical/emotional health has limited his social activites extremely.  A review of possible cognitive impairment was performed and the following was noted: he is having difficulty driving;  not experiencing changes in memory;  he is not having trouble with his finances A review of functional ability and level of safety was performed and the following was noted: Bathing ( Performs with assistance ); Dressing: ( Performs with assistance ); Eating: ( performs independently ); Toilet use: ( performs independently ); Transferring: ( performs independently ); Urine and Bowel continence: ( Abnormal ) Falls Risk: Has fallen 2 or more times or had one fall with injury in the past year.  In regard to hearing, he reports having trouble hearing the TV/radio when others do not and having to strain to hear or understand conversations, but not wearing hearing aid(s).  Concerning home safety, he reports that at home he DOES have adequate lighting, a skid resistant shower/tub, grab bars in the bath, handrails on stairs and functioning smoke alarms, but not absence of throw rugs.          Immunization Status: ** Has not received pneumococcal vaccination; ** Has not received Prevnar 13 vaccination; Physical Activity: He never excercises.; Type of diet patient normally eats is described as well-balanced with fruits and vegetables  Tobacco: Current Smoker: He currently smokes every day, 1 pack per day.   Preventative Health updated today.            PHQ-9 Depression Screening: Completed form scanned and in chart; Total Score 21       Pertaining to depression, Jimmie reports that his symptoms are not currently well controlled.  He is currently on Cymbalta 60 mg daily.  He continues to struggle intermittently with depressed mood and irritability.  He has previously tried and failed Celexa and wellbutrin. He denies SI or HI.        Pertaining to COPD, Jimmie reports that his symptoms are stable.  His current regimen is  albuterol as needed. He was prescribed breo ellipta at last visit and was prescribed symbicort in the past but is not taking either of these due to prohibitive cost.  Today, he endorses baseline shortness of breath and nonproductive cough. He has no fever or chills.  He continues to smoke.          In regard to the hyperlipidemia, unspecified, current treatment includes a low cholesterol/low fat diet.  Compliance with treatment has been good; he maintains his low cholesterol diet and follows up as directed.  He denies experiencing any hypercholesterolemia related symptoms.  Most recent lab tests include Total Cholesterol:  211 (mg/dL) (02/21/2020), HDL:  36 (mg/dL) (02/21/2020), Triglycerides:  223 (mg/dL) (02/21/2020), LDL:  130 (mg/dL) (02/21/2020).        In addition to the chronic conditions above, Jimmie has 2 acute concerns today.  First, he endorses left low back pain that has been present for the last week or so.  He denies any inciting event or injury.  He describes the sensation as a tightness or cramping sensation.  No radiation.  No paresthesias.  Secondly, he has continued to struggle with his sleep.  He has a known history of severe sleep apnea but does not tolerate positive airway pressure therapy.  He endorses multiple nighttime awakenings, difficulty falling asleep and significant daytime fatigue.  He is wondering if  "there is anything that he can try to help him get better sleep.    ROS:     CONSTITUTIONAL:  Positive for fatigue.   Negative for chills or fever.      EYES:  Negative for blurred vision.      CARDIOVASCULAR:  Negative for chest pain, dizziness, palpitations and edema.      RESPIRATORY:  Positive for dyspnea, sleep apnea and cough.      GASTROINTESTINAL:  Negative for abdominal pain, constipation, diarrhea, heartburn, nausea and vomiting.      MUSCULOSKELETAL:  Positive for back pain.      INTEGUMENTARY:  Negative for rash.      NEUROLOGICAL:  Negative for headaches, paresthesias and weakness.      PSYCHIATRIC:  Positive for depression, anhedonia and sleep disturbance.   Negative for suicidal thoughts.          Past Medical History / Family History / Social History:         Last Reviewed on 6/04/2021 05:02 PM by Jonh Mir    Past Medical History:             PAST MEDICAL HISTORY         Positive for    COPD and    Sleep Apnea;     Positive for    Depression;         PREVENTIVE HEALTH MAINTENANCE             BONE DENSITY: has never been done     COLORECTAL CANCER SCREENING: declines colorectal cancer screening, understands reason for testing     PSA: was last done 02- with normal results         Surgical History:         Positive for    Cholecystectomy;     Positive for    left meniscal repair; and    \"triple P' sinus surgery;;         Family History:         Positive for Pancreatric Cancer;     Positive for Depression;         Social History:     Occupation:    Disabled     Marital Status:      Children: 1 child         Tobacco/Alcohol/Supplements:     Last Reviewed on 6/04/2021 05:02 PM by Jonh Mir    Tobacco: Current Smoker: He currently smokes every day, 1 pack per day.          Substance Abuse History:     Last Reviewed on 6/04/2021 05:02 PM by Jonh Mir    None         Mental Health History:     Last Reviewed on 6/04/2021 05:02 PM by Jonh Mir        Major Depression         " Communicable Diseases (eg STDs):     Last Reviewed on 6/04/2021 05:02 PM by Jonh Mir    Reportable health conditions; NEGATIVE         Current Problems:     Last Reviewed on 6/04/2021 05:02 PM by Jonh Mir    Major depressive disorder, recurrent, unspecified    Sleep apnea, unspecified    Chronic obstructive pulmonary disease, unspecified    Dyspnea, unspecified    Encounter for screening for malignant neoplasm of colon    Encounter for screening for malignant neoplasm of prostate    Encounter for screening for depression    Localized edema    Shortness of breath    Encounter for general adult medical examination without abnormal findings    Hyperlipidemia, unspecified    Encounter for immunization    Low back pain    Insomnia, unspecified        Immunizations:     influenza, injectable, quadrivalent, preservative free (FLUZONE QUAD [id number]) 10/8/2020        Allergies:     Last Reviewed on 6/04/2021 05:02 PM by Jonh Mir    Latex, Natural Rubber: Rash         Current Medications:     Last Reviewed on 6/04/2021 05:02 PM by Jonh Mir    No Known Medications.    albuterol sulfate 90 mcg/actuation Inhalation HFA Aerosol Inhaler [inhale 1 - 2 puffs (90 - 180 mcg) by inhalation route every 4 hours as needed]    furosemide 40 mg oral tablet [TAKE 1 TABLET(40 MG) BY MOUTH DAILY]    diclofenac sodium 75 mg oral tablet, delayed release (enteric coated) [TAKE 1 TABLET(75 MG) BY MOUTH TWICE DAILY]    DULoxetine 60 mg oral capsule,delayed release (enteric coated) [TAKE 1 CAPSULE(60 MG) BY MOUTH EVERY DAY]    Breo Ellipta 200-25 mcg/dose Inhalation Blister, With Inhalation Device [inhale 1 puff by inhalation route once daily ]    escitalopram oxalate 10 mg oral tablet [take 1 tablet (10 mg) by oral route once daily]    cyclobenzaprine 10 mg oral tablet [take 1 tablet (10 mg) by oral route 2 times per day]    albuterol sulfate 2.5 mg /3 mL (0.083 %) Inhalation Solution for Nebulization [inhale 3 milliliters (2.5  "mg) by nebulization route 3 times per day for J44.9]    Pulmicort 0.5 mg/2 mL Inhalation Suspension for Nebulization [inhale 2 milliliters (0.5 mg) by nebulization route 2 times per day for J44.9]        Objective:        Vitals:         Current: 6/3/2021 1:22:29 PM    Ht:  6 ft, 0 in;  Wt: 360.6 lbs;  BMI: 48.9T: 98.6 F (oral);  BP: 125/84 mm Hg (right arm, sitting);  P: 109 bpm (right arm (BP Cuff), sitting);  sCr: 1.09 mg/dL;  GFR: 94.63O2 Sat: 96 % (room air)        Exams:     PHYSICAL EXAM:     GENERAL: Vitals recorded well developed, well nourished;  no apparent distress;     EYES: conjunctiva and cornea are normal;     RESPIRATORY: normal respiratory rate and pattern with no distress; Clear to auscultation bilateally; no rales (\"crackles\") present; no rhonchi; no wheezes;     SKIN:  No significant rashes, lesions or suspicious moles within limits of examination;     NEUROLOGIC: mental status: alert and oriented x 3; Grossly intact;     PSYCHIATRIC: appropriate affect and demeanor; normal speech pattern; Normal behavior;         Lab/Test Results:         Urine temperature: confirmed (06/03/2021),     All urine drug screen levels confirmed negative: yes (06/03/2021),     Date and time of last pill: NEW RX (06/03/2021),     Performed by: tls (06/03/2021),     Collection Time: 1415 (06/03/2021),             Assessment:         Z00.00   Encounter for general adult medical examination without abnormal findings       Z13.31   Encounter for screening for depression       F33.9   Major depressive disorder, recurrent, unspecified       J44.9   Chronic obstructive pulmonary disease, unspecified       E78.5   Hyperlipidemia, unspecified       M54.5   Low back pain       G47.00   Insomnia, unspecified           ORDERS:         Lab Orders:       [id number]  [facility] PHYSICAL: CMP, CBC, TSH, LIPID: [id number], [id number], [id number], [id number]  (Send-Out)            [id number]  Drug test prsmv qual dir optical obs per day  (In-House)              " Procedures Ordered:         Annual wellness visit, includes a PPPS, subsequent visit  (In-House)              Other Orders:         Depression screen positive and follow up plan documented  (In-House)                      Plan:         Encounter for general adult medical examination without abnormal findings- Appropriate screenings and vaccinations were reviewed and offered as indicated.          Orders:         Annual wellness visit, includes a PPPS, subsequent visit  (In-House)              Encounter for screening for depression    MIPS PHQ-9 Depression Screening: Completed form scanned and in chart; Total Score 21 Positive Depression Screen: Pharmacologic intervention initiated/modified           Orders:         Depression screen positive and follow up plan documented  (In-House)              Major depressive disorder, recurrent, unspecified- Not controlled.  Discontinue Cymbalta due to lack of efficacy.  Start Celexa 10 mg daily.  Return to clinic in 1 month.        Chronic obstructive pulmonary disease, unspecified- Stable but not completely controlled.  Continue albuterol as needed.  Nebulizer solution provided at patient request.  We will also try to initiate nebulizer treatment with Pulmicort if covered.        Hyperlipidemia, unspecified- Not currently on meds. Lipid panel ordered.    LABORATORY:  Labs ordered to be performed today include PHYSICAL PANEL; CMP, CBC, TSH, LIPID.            Orders:       [id number]  [facility] PHYSICAL: CMP, CBC, TSH, LIPID: [id number], [id number], [id number]  (Send-Out)              Low back pain- Appears to be a lumbar strain.  Will try Flexeril 10 mg 3 times daily as needed.  If symptoms persist, return to clinic.        Insomnia, unspecifiedPoor sleep is likely secondary to severe sleep apnea.  However, he has had a prior surgery for this and refuses positive pressure airway therapy.  Discussed possible treatments for insomnia as well as the risk of these  treatments given his severe sleep apnea.  He has decided he would like to try Ambien 5 mg nightly.    LABORATORY:  Labs ordered to be performed today include Drug screen.            Orders:       [id number]  Drug test prsmv qual dir optical obs per day  (In-House)                  Charge Capture:         Primary Diagnosis:     Z00.00  Encounter for general adult medical examination without abnormal findings           Orders:        Annual wellness visit, includes a PPPS, subsequent visit  (In-House)              Z13.31  Encounter for screening for depression           Orders:      [id number]  Office/outpatient visit; established patient, level 4  (In-House)              Depression screen positive and follow up plan documented  (In-House)              F33.9  Major depressive disorder, recurrent, unspecified     J44.9  Chronic obstructive pulmonary disease, unspecified     E78.5  Hyperlipidemia, unspecified     M54.5  Low back pain     G47.00  Insomnia, unspecified           Orders:      [id number]  Drug test prsmv qual dir optical obs per day  (In-House)

## 2021-06-07 DIAGNOSIS — R73.01 ABNORMAL FASTING GLUCOSE: Primary | ICD-10-CM

## 2021-06-15 ENCOUNTER — TELEPHONE (OUTPATIENT)
Dept: FAMILY MEDICINE CLINIC | Age: 61
End: 2021-06-15

## 2021-06-15 RX ORDER — ATORVASTATIN CALCIUM 20 MG/1
20 TABLET, FILM COATED ORAL DAILY
Qty: 90 TABLET | Refills: 1 | Status: SHIPPED | OUTPATIENT
Start: 2021-06-15 | End: 2022-09-08

## 2021-06-15 RX ORDER — DICLOFENAC SODIUM 75 MG/1
75 TABLET, DELAYED RELEASE ORAL 2 TIMES DAILY
Qty: 60 TABLET | Refills: 1 | Status: SHIPPED | OUTPATIENT
Start: 2021-06-15 | End: 2022-09-08

## 2021-06-15 RX ORDER — DICLOFENAC SODIUM 75 MG/1
1 TABLET, DELAYED RELEASE ORAL 2 TIMES DAILY
COMMUNITY
End: 2021-06-15 | Stop reason: SDUPTHER

## 2021-06-15 NOTE — TELEPHONE ENCOUNTER
Called pt to inform of lab results, he is agreeable to a statin if you could send to [facility] pharmacy here in [place]. He also states he needs a refill on Diclofenac 75mg#60, LF-5/17/21, LV-6/3/21

## 2021-07-02 VITALS
BODY MASS INDEX: 42.66 KG/M2 | OXYGEN SATURATION: 96 % | HEART RATE: 109 BPM | WEIGHT: 315 LBS | DIASTOLIC BLOOD PRESSURE: 84 MMHG | SYSTOLIC BLOOD PRESSURE: 125 MMHG | HEIGHT: 72 IN | TEMPERATURE: 98.6 F

## 2021-07-02 VITALS
WEIGHT: 315 LBS | SYSTOLIC BLOOD PRESSURE: 130 MMHG | OXYGEN SATURATION: 96 % | HEART RATE: 90 BPM | BODY MASS INDEX: 42.66 KG/M2 | HEIGHT: 72 IN | DIASTOLIC BLOOD PRESSURE: 69 MMHG | TEMPERATURE: 98.6 F

## 2021-07-13 RX ORDER — FUROSEMIDE 40 MG/1
TABLET ORAL
Qty: 90 TABLET | Refills: 1 | Status: SHIPPED | OUTPATIENT
Start: 2021-07-13 | End: 2022-09-08

## 2021-08-30 RX ORDER — ESCITALOPRAM OXALATE 10 MG/1
TABLET ORAL
Qty: 90 TABLET | Refills: 0 | Status: SHIPPED | OUTPATIENT
Start: 2021-08-30 | End: 2022-09-08

## 2022-09-08 ENCOUNTER — OFFICE VISIT (OUTPATIENT)
Dept: FAMILY MEDICINE CLINIC | Age: 62
End: 2022-09-08

## 2022-09-08 VITALS
DIASTOLIC BLOOD PRESSURE: 84 MMHG | HEART RATE: 90 BPM | HEIGHT: 72 IN | OXYGEN SATURATION: 95 % | BODY MASS INDEX: 42.66 KG/M2 | WEIGHT: 315 LBS | SYSTOLIC BLOOD PRESSURE: 125 MMHG

## 2022-09-08 DIAGNOSIS — J43.8 OTHER EMPHYSEMA: Primary | ICD-10-CM

## 2022-09-08 DIAGNOSIS — Z11.59 ENCOUNTER FOR SCREENING FOR OTHER VIRAL DISEASES: ICD-10-CM

## 2022-09-08 DIAGNOSIS — E78.00 HIGH CHOLESTEROL: ICD-10-CM

## 2022-09-08 DIAGNOSIS — R73.9 ELEVATED BLOOD SUGAR LEVEL: ICD-10-CM

## 2022-09-08 DIAGNOSIS — G47.33 OBSTRUCTIVE SLEEP APNEA: ICD-10-CM

## 2022-09-08 DIAGNOSIS — Z12.5 SCREENING FOR PROSTATE CANCER: ICD-10-CM

## 2022-09-08 DIAGNOSIS — G47.09 OTHER INSOMNIA: ICD-10-CM

## 2022-09-08 DIAGNOSIS — R79.89 ABNORMAL TSH: ICD-10-CM

## 2022-09-08 DIAGNOSIS — I87.2 VENOUS INSUFFICIENCY: ICD-10-CM

## 2022-09-08 PROBLEM — F34.1 DYSTHYMIC DISORDER: Status: ACTIVE | Noted: 2022-09-08

## 2022-09-08 PROBLEM — F34.1 DYSTHYMIC DISORDER: Status: RESOLVED | Noted: 2022-09-08 | Resolved: 2022-09-08

## 2022-09-08 PROBLEM — F17.210 CIGARETTE SMOKER: Status: ACTIVE | Noted: 2022-09-08

## 2022-09-08 PROBLEM — J44.9 CHRONIC OBSTRUCTIVE PULMONARY DISEASE, UNSPECIFIED: Status: ACTIVE | Noted: 2022-09-08

## 2022-09-08 PROCEDURE — 99214 OFFICE O/P EST MOD 30 MIN: CPT | Performed by: FAMILY MEDICINE

## 2022-09-08 RX ORDER — TRAZODONE HYDROCHLORIDE 50 MG/1
50 TABLET ORAL NIGHTLY
Qty: 30 TABLET | Refills: 1 | Status: SHIPPED | OUTPATIENT
Start: 2022-09-08 | End: 2022-12-21 | Stop reason: ALTCHOICE

## 2022-09-08 RX ORDER — ALBUTEROL SULFATE 0.63 MG/3ML
1 SOLUTION RESPIRATORY (INHALATION) EVERY 4 HOURS PRN
Qty: 180 EACH | Refills: 2 | Status: SHIPPED | OUTPATIENT
Start: 2022-09-08 | End: 2022-09-28 | Stop reason: SDUPTHER

## 2022-09-08 NOTE — PROGRESS NOTES
"Chief Complaint  Establish Care (From Dr Mir) and COPD (Worsening SOA)    Subjective          Rusyt Benitez presents to [facility]  --LONG HISTORY OF COPD, CONTINUES TO SMOKE, USES AN ALBUTEROL NEB 5-6 TIMES A DAY.  HAS BEEN TRID ON RADHA-ACTING MEDS BUT CANNOT AFFORD THEM ALTHOUGH HIS INSURANCE SITUATION  MAY BE CHANGING SOON  --WAS ON LIPITOR BUT NOT CURRENTLY TAKING, NO RECENT LAB RESULTS  --HAS SHELL, S/P PHARYNGEOPLASTY, NOT ON CPAP CURRENTLY BUT NEEDS TO RESTART  --DIFFICULTY FALLING ASLEEP AND STAYING ASLEEP, OTC MEDS ONLY HELP FOR A WHILE  --LEGS SWELL A LOT  --DUE FOR ROUTINE LABS        Allergies   Allergen Reactions   • Latex Rash        Health Maintenance Due   Topic Date Due   • COLORECTAL CANCER SCREENING  Never done   • Pneumococcal Vaccine 0-64 (1 - PCV) Never done   • ZOSTER VACCINE (1 of 2) Never done   • LUNG CANCER SCREENING  Never done   • ANNUAL WELLNESS VISIT  Never done        No current outpatient medications on file prior to visit.     No current facility-administered medications on file prior to visit.       Immunization History   Administered Date(s) Administered   • COVID-19 (PFIZER) PURPLE CAP 04/01/2021, 04/29/2021       Review of Systems   Constitutional: Positive for fatigue. Negative for activity change, appetite change, chills and fever.   HENT: Negative for congestion, ear pain, rhinorrhea and sore throat.    Respiratory: Positive for cough and shortness of breath.    Cardiovascular: Positive for leg swelling. Negative for chest pain and palpitations.   Gastrointestinal: Negative for abdominal pain, constipation, diarrhea, nausea and vomiting.   Musculoskeletal: Negative for arthralgias and myalgias.   Neurological: Negative for headache.        Objective     /84 (BP Location: Left arm, Patient Position: Sitting)   Pulse 90   Ht 182.9 cm (72\")   Wt (!) 155 kg (342 lb)   SpO2 95% Comment: on room air  BMI 46.38 kg/m²       Physical " Exam  Vitals and nursing note reviewed.   Constitutional:       General: He is not in acute distress.     Appearance: Normal appearance.   Cardiovascular:      Rate and Rhythm: Normal rate and regular rhythm.      Pulses: Normal pulses.      Heart sounds: Normal heart sounds. No murmur heard.  Pulmonary:      Effort: Pulmonary effort is normal.      Breath sounds: Normal breath sounds.   Abdominal:      Palpations: Abdomen is soft.      Tenderness: There is no abdominal tenderness.   Musculoskeletal:      Cervical back: Neck supple.      Right lower leg: Edema present.      Left lower leg: Edema present.      Comments: THREE PLUS EDEMA BUT NEGATIVE RUPESH'S AND 2+ DP PULSES    Lymphadenopathy:      Cervical: No cervical adenopathy.   Neurological:      General: No focal deficit present.      Mental Status: He is alert.      Cranial Nerves: No cranial nerve deficit.      Coordination: Coordination normal.      Gait: Gait normal.   Psychiatric:         Mood and Affect: Mood normal.         Behavior: Behavior normal.         Result Review :                             Assessment and Plan      Diagnoses and all orders for this visit:    1. Other emphysema (HCC) (Primary)  Assessment & Plan:  COPD is unchanged.  Discussed monitoring symptoms and use of quick-relief medications and contacting us early in the course of exacerbations.  Counseled to avoid exposure to cigarette smoke.   IF HIS INSURANCE SITUATION CHANGES WILL CONSIDER TRYING A LONG-ACTING MEDICATION OR PULMONARY EVAL   Pt is unable to safely use a cane or walker due to immobility related to diagnosis. A heavy duty wheel chair is needed to assist with daily living activities inside the home. Pt has upper body strength and mental capabilities to propel the wheelchair inside the home.       Orders:  -     TSH; Future  -     albuterol (ACCUNEB) 0.63 MG/3ML nebulizer solution; Take 3 mL by nebulization Every 4 (Four) Hours As Needed for Wheezing.  Dispense: 180  each; Refill: 2    2. Screening for prostate cancer  -     PSA Screen; Future  -     TSH; Future    3. Elevated blood sugar level  -     Hemoglobin A1c; Future  -     TSH; Future    4. High cholesterol  Assessment & Plan:  Lipid abnormalities are unchanged.  Nutritional counseling was provided.  Lipids will be reassessed in 3 months   WILL CHECK LABS AND PROCEED ACCORDINGLY .    Orders:  -     CBC (No Diff); Future  -     Comprehensive Metabolic Panel; Future  -     Lipid Panel; Future  -     TSH; Future    5. Encounter for screening for other viral diseases  -     TSH; Future  -     Hepatitis C Antibody; Future    6. Abnormal TSH  -     TSH; Future    7. Other insomnia  Assessment & Plan:  WILL TRY A TCA, SHOULD IMPROVE WITH TREATMENT OF THE SHELL AND COPD     Orders:  -     TSH; Future    8. Obstructive sleep apnea  -     TSH; Future  -     traZODone (DESYREL) 50 MG tablet; Take 1 tablet by mouth Every Night for 180 days.  Dispense: 30 tablet; Refill: 1  -     Ambulatory Referral to Sleep Medicine    9. Venous insufficiency  Assessment & Plan:  ADVISE ELEVATION, SALT AND FLUID REDUCTION.  CONSIDER FURTHER WORKUP             Follow Up     Return in about 2 months (around 11/8/2022).    Patient was given instructions and counseling regarding his condition or for health maintenance advice. Please see specific information pulled into the AVS if appropriate.

## 2022-09-08 NOTE — ASSESSMENT & PLAN NOTE
Lipid abnormalities are unchanged.  Nutritional counseling was provided.  Lipids will be reassessed in 3 months   WILL CHECK LABS AND PROCEED ACCORDINGLY .

## 2022-09-08 NOTE — ASSESSMENT & PLAN NOTE
COPD is unchanged.  Discussed monitoring symptoms and use of quick-relief medications and contacting us early in the course of exacerbations.  Counseled to avoid exposure to cigarette smoke.   IF HIS INSURANCE SITUATION CHANGES WILL CONSIDER TRYING A LONG-ACTING MEDICATION OR PULMONARY EVAL   Pt is unable to safely use a cane or walker due to immobility related to diagnosis. A heavy duty wheel chair is needed to assist with daily living activities inside the home. Pt has upper body strength and mental capabilities to propel the wheelchair inside the home.

## 2022-09-09 ENCOUNTER — TELEPHONE (OUTPATIENT)
Dept: FAMILY MEDICINE CLINIC | Age: 62
End: 2022-09-09

## 2022-09-09 DIAGNOSIS — J44.9 CHRONIC OBSTRUCTIVE PULMONARY DISEASE, UNSPECIFIED COPD TYPE: Primary | ICD-10-CM

## 2022-09-09 DIAGNOSIS — I87.2 VENOUS INSUFFICIENCY: ICD-10-CM

## 2022-09-09 DIAGNOSIS — J43.8 OTHER EMPHYSEMA: ICD-10-CM

## 2022-09-09 NOTE — TELEPHONE ENCOUNTER
----- Message from Franklin Ruth MD sent at 9/8/2022  4:30 PM EDT -----  THIS MAN HAS SEVERE COPD AND CAN ONLY WALK FOR SHORT DISTANCES.  I WOULD LIKE TO GET HIM A MANUAL WHEELCHAIR.  PLEASE CALL HIM OR HIS WIFE SO THAT WE CAN ARRANGE THIS FOR HIM.  THANKS

## 2022-09-12 NOTE — TELEPHONE ENCOUNTER
.  Progress Note    Patient: Jah Hansen Date: 9/23/2018   male, 62 year old  Admit Date: 9/14/2018   Attending: Kian Hamilton MD                Active Hospital Problems    Diagnosis Date Noted   • S/P craniotomy 09/15/2018     Priority: Low   • Uncontrolled hypertension 09/15/2018     Priority: Low   • SDH (subdural hematoma) (CMS/HCC) 09/14/2018     Priority: Low   • Multiple myeloma (CMS/HCC) 09/14/2018     Priority: Low   • HTN (hypertension) 09/14/2018     Priority: Low        SUBJECTIVE: 62-year-old admitted for subdural hematoma status post evacuation.  Speech difficulty is much improved today.He denies fever,cp or sob.No nausea or vomiting. No abd pain  MEDICATIONS: Personally reviewed today in this patient's active orders section of Epic.  ALLERGIES: Personally reviewed today in Epic.  ROS: Pertinent systems negative except as above.    PHYSICAL EXAM:      Vital 24 Hour Range Most Recent Value   Temperature Temp  Min: 97.4 °F (36.3 °C)  Max: 97.6 °F (36.4 °C) 97.5 °F (36.4 °C)   Pulse Pulse  Min: 60  Max: 82 75   Respiratory Resp  Min: 16  Max: 18 16   Blood Pressure BP  Min: 139/77  Max: 166/97 139/77   Pulse Oximetry SpO2  Min: 96 %  Max: 100 % 96 %   Arterial BP No Data Recorded     O2 No Data Recorded       Vital Most Recent Value First Value   Weight 102 kg Weight: 103.7 kg   Height 5' 8\" (172.7 cm) Height: 5' 8\" (172.7 cm)   BMI 34.26 N/A       General - Alert and oriented ×3. Answers questions appropriately.  Nontoxic-appearing.  HEENT - Pupils equal. Sclerae are anicteric. Oropharyngeal mucous membranes are moist. Neck is soft and supple. Has a craniotomy scar which appears clean and dry  Cardiovascular - RRR, no murmurs, rubs, gallops, or clicks. Nondisplaced PMI.  Pulmonary - Good respiratory effort. No accessory muscle use.   Lungs - Clear to auscultation bilaterally. No wheezes, rales, or rhonchi.   Abdomen - Soft, nontender,nondistended. Bowel sounds are normoactive. No guarding or  Left message for wife to call back.    rebound tenderness. No Hepatosplenomegaly.  Extremities - No pedal edema or calf tenderness bilaterally.  Skin- No rashes or lesions.   Neurologic - Moves all extremities. Cranial nerves II-XII are intact. No focal deficits.significant improvement in  aphasia      LABS:  Recent Labs   Lab  09/23/18   0550  09/22/18   0437  09/21/18   0530   WBC  10.6  11.4*  11.0   RBC  3.43*  3.28*  3.47*   HGB  11.0*  10.6*  11.1*   HCT  33.4*  31.8*  33.3*   PLT  167  174  177   SEG  93  95  93     Recent Labs   Lab  09/23/18   0550  09/22/18   0437  09/21/18   1056  09/21/18   0530   SODIUM  140  139  138  137   POTASSIUM  4.6  4.6   --   4.4   CHLORIDE  105  106   --   101   CO2  26  26   --   28   BUN  24*  24*   --   21*   CREATININE  0.71  0.79   --   0.78   GFRNA  >90  >90   --   >90   GLUCOSE  120*  131*   --   128*   CALCIUM  8.0*  8.2*   --   8.7   INR  1.1  1.1   --   1.1       ASSESSMENT AND PLAN:     · Subdural hematoma status post craniotomy- Aphasia has improved significantly.  · Hypertension - blood pressure is controlled  · Possible seizure- continue Vimpat  DVT Prophylaxis - SCDs and TEDs      Disposition:DC to inpatient rehab tomorrow    I personally spent 13 minutes today in the care and management of this patient.      Kian Hamilton MD  Hospitalist  9/23/2018  12:35 PM

## 2022-09-16 ENCOUNTER — LAB (OUTPATIENT)
Dept: LAB | Facility: HOSPITAL | Age: 62
End: 2022-09-16

## 2022-09-16 DIAGNOSIS — R79.89 ABNORMAL TSH: ICD-10-CM

## 2022-09-16 DIAGNOSIS — Z12.5 SCREENING FOR PROSTATE CANCER: ICD-10-CM

## 2022-09-16 DIAGNOSIS — E78.00 HIGH CHOLESTEROL: ICD-10-CM

## 2022-09-16 DIAGNOSIS — J43.8 OTHER EMPHYSEMA: ICD-10-CM

## 2022-09-16 DIAGNOSIS — G47.33 OBSTRUCTIVE SLEEP APNEA: ICD-10-CM

## 2022-09-16 DIAGNOSIS — R73.9 ELEVATED BLOOD SUGAR LEVEL: ICD-10-CM

## 2022-09-16 DIAGNOSIS — G47.09 OTHER INSOMNIA: ICD-10-CM

## 2022-09-16 DIAGNOSIS — Z11.59 ENCOUNTER FOR SCREENING FOR OTHER VIRAL DISEASES: ICD-10-CM

## 2022-09-16 LAB
ALBUMIN SERPL-MCNC: 3.8 G/DL (ref 3.5–5.2)
ALBUMIN/GLOB SERPL: 1.3 G/DL
ALP SERPL-CCNC: 71 U/L (ref 39–117)
ALT SERPL W P-5'-P-CCNC: 12 U/L (ref 1–41)
ANION GAP SERPL CALCULATED.3IONS-SCNC: 7.1 MMOL/L (ref 5–15)
AST SERPL-CCNC: 15 U/L (ref 1–40)
BILIRUB SERPL-MCNC: 0.4 MG/DL (ref 0–1.2)
BUN SERPL-MCNC: 14 MG/DL (ref 8–23)
BUN/CREAT SERPL: 13 (ref 7–25)
CALCIUM SPEC-SCNC: 9.2 MG/DL (ref 8.6–10.5)
CHLORIDE SERPL-SCNC: 99 MMOL/L (ref 98–107)
CHOLEST SERPL-MCNC: 184 MG/DL (ref 0–200)
CO2 SERPL-SCNC: 32.9 MMOL/L (ref 22–29)
CREAT SERPL-MCNC: 1.08 MG/DL (ref 0.76–1.27)
DEPRECATED RDW RBC AUTO: 49.8 FL (ref 37–54)
EGFRCR SERPLBLD CKD-EPI 2021: 77.6 ML/MIN/1.73
ERYTHROCYTE [DISTWIDTH] IN BLOOD BY AUTOMATED COUNT: 16.4 % (ref 12.3–15.4)
GLOBULIN UR ELPH-MCNC: 3 GM/DL
GLUCOSE SERPL-MCNC: 104 MG/DL (ref 65–99)
HBA1C MFR BLD: 6.6 % (ref 4.8–5.6)
HCT VFR BLD AUTO: 48.1 % (ref 37.5–51)
HCV AB SER DONR QL: NORMAL
HDLC SERPL-MCNC: 35 MG/DL (ref 40–60)
HGB BLD-MCNC: 14.4 G/DL (ref 13–17.7)
LDLC SERPL CALC-MCNC: 124 MG/DL (ref 0–100)
LDLC/HDLC SERPL: 3.47 {RATIO}
MCH RBC QN AUTO: 25.4 PG (ref 26.6–33)
MCHC RBC AUTO-ENTMCNC: 29.9 G/DL (ref 31.5–35.7)
MCV RBC AUTO: 84.7 FL (ref 79–97)
PLATELET # BLD AUTO: 294 10*3/MM3 (ref 140–450)
PMV BLD AUTO: 8.3 FL (ref 6–12)
POTASSIUM SERPL-SCNC: 4.6 MMOL/L (ref 3.5–5.2)
PROT SERPL-MCNC: 6.8 G/DL (ref 6–8.5)
PSA SERPL-MCNC: 0.55 NG/ML (ref 0–4)
RBC # BLD AUTO: 5.68 10*6/MM3 (ref 4.14–5.8)
SODIUM SERPL-SCNC: 139 MMOL/L (ref 136–145)
TRIGL SERPL-MCNC: 137 MG/DL (ref 0–150)
TSH SERPL DL<=0.05 MIU/L-ACNC: 4.98 UIU/ML (ref 0.27–4.2)
VLDLC SERPL-MCNC: 25 MG/DL (ref 5–40)
WBC NRBC COR # BLD: 10.97 10*3/MM3 (ref 3.4–10.8)

## 2022-09-16 PROCEDURE — 83036 HEMOGLOBIN GLYCOSYLATED A1C: CPT

## 2022-09-16 PROCEDURE — 85027 COMPLETE CBC AUTOMATED: CPT

## 2022-09-16 PROCEDURE — 84443 ASSAY THYROID STIM HORMONE: CPT

## 2022-09-16 PROCEDURE — 86803 HEPATITIS C AB TEST: CPT

## 2022-09-16 PROCEDURE — 80061 LIPID PANEL: CPT

## 2022-09-16 PROCEDURE — 80053 COMPREHEN METABOLIC PANEL: CPT

## 2022-09-16 PROCEDURE — G0103 PSA SCREENING: HCPCS

## 2022-09-16 PROCEDURE — 36415 COLL VENOUS BLD VENIPUNCTURE: CPT

## 2022-09-19 ENCOUNTER — TELEPHONE (OUTPATIENT)
Dept: FAMILY MEDICINE CLINIC | Age: 62
End: 2022-09-19

## 2022-09-19 NOTE — TELEPHONE ENCOUNTER
Valeriano Lindsey said they need verbiage to support the medical necessity in order to proceed with the pt's insurance for a wheelchair.      Pt is unable to safely use a cane or walker due to immobility related to diagnosis. A heavy duty wheel chair is needed to assist with daily living activities inside the home. Pt has upper body strength and mental capabilities to propel the wheelchair inside the home.     Fax corrected note to [phone].  If any questions call [phone].

## 2022-09-28 DIAGNOSIS — J43.8 OTHER EMPHYSEMA: ICD-10-CM

## 2022-09-28 NOTE — TELEPHONE ENCOUNTER
Caller: Rusty Benitez    Relationship: Self    Best call back number: [phone]    Requested Prescriptions:   Requested Prescriptions     Pending Prescriptions Disp Refills   • albuterol (ACCUNEB) 0.63 MG/3ML nebulizer solution 180 each 2     Sig: Take 3 mL by nebulization Every 4 (Four) Hours As Needed for Wheezing.        Pharmacy where request should be sent: [id number] - [place], [place] - [address] AT [facility] [address] [facility] - [phone] [facility] [phone] FX     Does the patient have less than a 3 day supply:  [x] Yes  [] No

## 2022-09-29 RX ORDER — ALBUTEROL SULFATE 0.63 MG/3ML
1 SOLUTION RESPIRATORY (INHALATION) EVERY 4 HOURS PRN
Qty: 180 EACH | Refills: 2 | Status: SHIPPED | OUTPATIENT
Start: 2022-09-29 | End: 2022-12-13 | Stop reason: SDUPTHER

## 2022-11-23 ENCOUNTER — APPOINTMENT (OUTPATIENT)
Dept: SLEEP MEDICINE | Facility: HOSPITAL | Age: 62
End: 2022-11-23

## 2022-12-13 DIAGNOSIS — J43.8 OTHER EMPHYSEMA: ICD-10-CM

## 2022-12-13 RX ORDER — ALBUTEROL SULFATE 0.63 MG/3ML
1 SOLUTION RESPIRATORY (INHALATION) EVERY 4 HOURS PRN
Qty: 180 EACH | Refills: 1 | Status: SHIPPED | OUTPATIENT
Start: 2022-12-13 | End: 2023-04-04 | Stop reason: SDUPTHER

## 2022-12-13 NOTE — TELEPHONE ENCOUNTER
Rx Refill Note  Requested Prescriptions     Pending Prescriptions Disp Refills   • albuterol (ACCUNEB) 0.63 MG/3ML nebulizer solution 180 each 2     Sig: Take 3 mL by nebulization Every 4 (Four) Hours As Needed for Wheezing.      Last office visit with prescribing clinician: 9/8/2022     Next office visit with prescribing clinician: 1/23/2023      Zoie Cruz LPN  12/13/22, 08:44 EST

## 2022-12-21 ENCOUNTER — OFFICE VISIT (OUTPATIENT)
Dept: SLEEP MEDICINE | Facility: HOSPITAL | Age: 62
End: 2022-12-21

## 2022-12-21 VITALS
OXYGEN SATURATION: 92 % | HEIGHT: 72 IN | SYSTOLIC BLOOD PRESSURE: 134 MMHG | DIASTOLIC BLOOD PRESSURE: 64 MMHG | WEIGHT: 315 LBS | BODY MASS INDEX: 42.66 KG/M2 | HEART RATE: 94 BPM

## 2022-12-21 DIAGNOSIS — G47.8 NON-RESTORATIVE SLEEP: ICD-10-CM

## 2022-12-21 DIAGNOSIS — G47.09 OTHER INSOMNIA: ICD-10-CM

## 2022-12-21 DIAGNOSIS — G47.10 HYPERSOMNIA: ICD-10-CM

## 2022-12-21 DIAGNOSIS — J44.9 CHRONIC OBSTRUCTIVE PULMONARY DISEASE, UNSPECIFIED COPD TYPE: ICD-10-CM

## 2022-12-21 DIAGNOSIS — R06.83 SNORING: ICD-10-CM

## 2022-12-21 DIAGNOSIS — G47.30 OBSERVED SLEEP APNEA: Primary | ICD-10-CM

## 2022-12-21 DIAGNOSIS — E66.01 CLASS 3 SEVERE OBESITY DUE TO EXCESS CALORIES WITHOUT SERIOUS COMORBIDITY WITH BODY MASS INDEX (BMI) OF 45.0 TO 49.9 IN ADULT: ICD-10-CM

## 2022-12-21 PROBLEM — E66.813 CLASS 3 SEVERE OBESITY DUE TO EXCESS CALORIES WITHOUT SERIOUS COMORBIDITY WITH BODY MASS INDEX (BMI) OF 45.0 TO 49.9 IN ADULT: Status: ACTIVE | Noted: 2022-12-21

## 2022-12-21 PROCEDURE — 99204 OFFICE O/P NEW MOD 45 MIN: CPT | Performed by: INTERNAL MEDICINE

## 2022-12-21 PROCEDURE — G0463 HOSPITAL OUTPT CLINIC VISIT: HCPCS

## 2022-12-21 RX ORDER — IBUPROFEN 200 MG
200 TABLET ORAL EVERY 6 HOURS PRN
COMMUNITY

## 2022-12-21 RX ORDER — ZOLPIDEM TARTRATE 5 MG/1
5 TABLET ORAL TAKE AS DIRECTED
Qty: 2 TABLET | Refills: 0 | Status: SHIPPED | OUTPATIENT
Start: 2022-12-21 | End: 2023-01-23

## 2022-12-21 RX ORDER — DOXYLAMINE SUCCINATE 25 MG/1
25 TABLET ORAL NIGHTLY PRN
COMMUNITY

## 2022-12-21 NOTE — PROGRESS NOTES
[address] [address]  Phone: [phone]  Fax: [phone]      Rusty Benitez  [id number]   1960  62 y.o.  male      PCP:Franklin Ruth MD    Type of service: Initial New Patient Office Visit  Date of service: 12/21/2022    Chief Complaint   Patient presents with   • Witnessed Apnea   • Snoring   • Non-restorative Sleep   • Fatigue   • Dry Mouth   • Daytime Sleepiness   • Obesity       History of present illness;  Rusty Benitez 62 y.o.  is a new patient for me and was seen today for sleep related problems of snoring, non-restorative sleep and witnessed apneas. The symptoms are present for more than 10 years and they are persistent in nature.  The snoring is present in all positions and it is loud.  Patient has  prior surgery  UPPP.     Patient has multiple medical problems including insomnia COPD.  Unfortunately still smoking cigarettes.  He has gained weight about 150 pounds.  Used to work as a  but unable to work because of his medical conditions.    Patient gives the following sleep history.  Sleep schedule:  Bedtime: Variable  Wake time: Variable  Normally takes about 1 to 2-hour minutes to fall asleep  Average hours of sleep 3 to 5  Number of naps per day 2  Symptoms  In addition to snoring, nonrestorative sleep and witnessed apneas patient gives the following associated symptoms.  Have you ever awakened gasping for breath, coughing, choking: Yes   Change in weight,  Yes gained 150 pounds  Morning headaches  Yes   Awaken with a sore throat or dry mouth  Yes   Leg jerking at night:  No   Crawly feeling/urge sensation to move in the legs: No   Teeth grinding:No   Have you ever awakened at night with a sour taste or burning sensation in your chest:  No   Do you have muscle weakness with laughing or anger or sleep paralysis:  No   Have you ever felt paralyzed while going to sleep or waking up:  No   Sleepwalking,  "nightmares, No   Nocturia (urination at night): 3 times per night  Memory Problem:No     Past medical history: (Relevant to sleep medicine)  1. Anxiety  2. Depression  3. ADHD  4. Asthma  5. COPD    • Medications are reviewed by me and documented in the encounter  • Allergies reviewed and documented in encounter    Social history:  Do you drive a commercial vehicle:  No   Shift work:  No   Tobacco use:  Yes   Alcohol use:  3 per week  Caffeinated drinks: 5    FAMILY HISTORY (Your mother, father, brothers and sisters)  1. No history of sleep apnea    REVIEW OF SYSTEMS.  Full review of systems available on the intake form which is scanned in the media tab.  The relevant positive are noted below  1. Daytime excessive sleepiness with [place] Sleepiness Scale :Total score: 18   2. Snoring  3. Fatigue      Physical exam:  Vitals:    12/21/22 1300   BP: 134/64   Pulse: 94   SpO2: 92%   Weight: (!) 160 kg (352 lb 12.8 oz)   Height: 182.9 cm (72\")    Body mass index is 47.85 kg/m². Neck Circumference: 20 inches  HEENT: Head is atraumatic, normocephalic  Eyes: pupils are round equal and reacting to light and accommodation, conjunctiva normal  Nose: no nasal septal defects or deviation and the nasal passages are clear, no nasal polyps,  Throat: Surgical changes seen due to UPPP oral airway Mallampati class 3  NECK:Neck Circumference: 20 inches, trachea is in the midline, thyroid not enlarged  RESPIRATORY SYSTEM: Breath sounds are equal on both sides, there are no wheezes   CARDIOVASULAR SYSTEM: Heart sounds are regular rhythm and kenny rate, no edema  EXTREMITES: No cyanosis, clubbing  NEUROLOGICAL SYSTEM: Oriented x 3, no gross motor defects, gait normal    Labs reviewed.  TSH Results:  TSH    TSH 9/16/22   TSH 4.980 (A)   (A) Abnormal value             Most Recent A1C    HGBA1C Most Recent 9/16/22   Hemoglobin A1C 6.60 (A)   (A) Abnormal value               Assessment and plan:  · Witnessed apnea (R06.81) patient's symptoms " and examination is consistent with sleep apnea (G47.30)  I have talked to the patient about the signs and symptoms of sleep apnea. In addition, I have also discussed pathophysiology of sleep apnea.  I also discussed the complications of untreated sleep apnea including effects on hypertension, diabetes mellitus and nonrestorative sleep with hypersomnia which can increase risk for motor vehicle accidents.  Untreated sleep apnea is also a risk factor for development of atrial fibrillation, pulmonary hypertension and stroke.  Discussed in detail of various testing methods including home-based and lab based sleep studies.  Based on history and physical examination the most appropriate study is split-night study.  The order for the sleep study is placed in Dr[facility].  The test will be scheduled after approval from insurance. Treatment and management will be discussed after the test is completed.  Patient was given opportunity to ask questions and all the questions were answered.  A prescription for zolpidem has been given for the sleep study to improve sleep efficiency  · Snoring (R06.83) snoring is the sound created by turbulent airflow vibrating upper airway soft tissue.  I have also discussed factors affecting snoring including sleep deprivation, sleeping on the back and alcohol ingestion. To minimize snoring, patient is advised to have adequate sleep, sleep on the side and avoid alcohol and sedative medications before bedtime  · Daytime excessive sleepiness .  It was assessed with [place] Sleepiness Scale of Total score: 18.  There are many causes for daytime excessive sleepiness including sleep depression, shiftwork syndrome, depression and other medical disorders including heart, kidney and liver failure.  The most serious cause of excessive sleepiness is due to neurological conditions like narcolepsy/cataplexy.  But the most common cause of excessive sleepiness is due to sleep apnea with frequent awakenings during sleep  time.  I have discussed safety of driving and to remain vigilant while driving.  · Obesity 3, with BMI Body mass index is 47.85 kg/m².. I have discussed the relationship between weight and sleep apnea.There is direct correlation between weight and severity of sleep apnea.  Weight reduction is encouraged, as it is going to reduce the severity of sleep apnea. I have also discussed with the patient diet and exercise to achieve ideal body weight  · Insomnia  · COPD,     I have also discussed with the patient the following  • Sleep hygiene: Maintaining a regular bedtime and wake time, not to watch television or work in bed, limit caffeine-containing beverages before bed time and avoid naps during the day  • Adequate amount of sleep.  Generally most people needs about 7 to 8 hours of sleep.  • Return for 31 to 90 days after PAP setup with down load..  Patient's questions were answered.        Andrew Little MD  Sleep Medicine.  Medical Director, [facility] sleep centers  12/21/2022 ,

## 2023-01-03 ENCOUNTER — CLINICAL SUPPORT (OUTPATIENT)
Dept: FAMILY MEDICINE CLINIC | Age: 63
End: 2023-01-03
Payer: MEDICARE

## 2023-01-03 DIAGNOSIS — Z23 IMMUNIZATION DUE: Primary | ICD-10-CM

## 2023-01-03 PROCEDURE — G0008 ADMIN INFLUENZA VIRUS VAC: HCPCS | Performed by: FAMILY MEDICINE

## 2023-01-03 PROCEDURE — 90686 IIV4 VACC NO PRSV 0.5 ML IM: CPT | Performed by: FAMILY MEDICINE

## 2023-01-18 ENCOUNTER — HOSPITAL ENCOUNTER (OUTPATIENT)
Dept: SLEEP MEDICINE | Facility: HOSPITAL | Age: 63
Discharge: HOME | End: 2023-01-18
Admitting: INTERNAL MEDICINE
Payer: MEDICARE

## 2023-01-18 DIAGNOSIS — G47.8 NON-RESTORATIVE SLEEP: ICD-10-CM

## 2023-01-18 DIAGNOSIS — G47.09 OTHER INSOMNIA: ICD-10-CM

## 2023-01-18 DIAGNOSIS — R06.83 SNORING: ICD-10-CM

## 2023-01-18 DIAGNOSIS — J44.9 CHRONIC OBSTRUCTIVE PULMONARY DISEASE, UNSPECIFIED COPD TYPE: ICD-10-CM

## 2023-01-18 DIAGNOSIS — E66.01 CLASS 3 SEVERE OBESITY DUE TO EXCESS CALORIES WITHOUT SERIOUS COMORBIDITY WITH BODY MASS INDEX (BMI) OF 45.0 TO 49.9 IN ADULT: ICD-10-CM

## 2023-01-18 DIAGNOSIS — G47.10 HYPERSOMNIA: ICD-10-CM

## 2023-01-18 DIAGNOSIS — G47.30 OBSERVED SLEEP APNEA: ICD-10-CM

## 2023-01-18 PROCEDURE — 95811 POLYSOM 6/>YRS CPAP 4/> PARM: CPT | Performed by: INTERNAL MEDICINE

## 2023-01-18 PROCEDURE — 95811 POLYSOM 6/>YRS CPAP 4/> PARM: CPT

## 2023-01-23 ENCOUNTER — OFFICE VISIT (OUTPATIENT)
Dept: FAMILY MEDICINE CLINIC | Age: 63
End: 2023-01-23
Payer: MEDICARE

## 2023-01-23 VITALS
WEIGHT: 315 LBS | HEIGHT: 72 IN | OXYGEN SATURATION: 94 % | DIASTOLIC BLOOD PRESSURE: 84 MMHG | SYSTOLIC BLOOD PRESSURE: 128 MMHG | HEART RATE: 85 BPM | BODY MASS INDEX: 42.66 KG/M2

## 2023-01-23 DIAGNOSIS — G47.33 OBSTRUCTIVE SLEEP APNEA: ICD-10-CM

## 2023-01-23 DIAGNOSIS — R06.83 SNORING: ICD-10-CM

## 2023-01-23 DIAGNOSIS — R73.03 PREDIABETES: ICD-10-CM

## 2023-01-23 DIAGNOSIS — J43.8 OTHER EMPHYSEMA: Primary | ICD-10-CM

## 2023-01-23 DIAGNOSIS — G47.33 OSA (OBSTRUCTIVE SLEEP APNEA): Primary | ICD-10-CM

## 2023-01-23 DIAGNOSIS — I87.2 VENOUS INSUFFICIENCY: ICD-10-CM

## 2023-01-23 DIAGNOSIS — Z86.39 HISTORY OF HIGH CHOLESTEROL: ICD-10-CM

## 2023-01-23 DIAGNOSIS — E66.01 MORBID (SEVERE) OBESITY DUE TO EXCESS CALORIES: ICD-10-CM

## 2023-01-23 PROBLEM — E66.813 CLASS 3 SEVERE OBESITY DUE TO EXCESS CALORIES WITHOUT SERIOUS COMORBIDITY WITH BODY MASS INDEX (BMI) OF 45.0 TO 49.9 IN ADULT: Status: RESOLVED | Noted: 2022-12-21 | Resolved: 2023-01-23

## 2023-01-23 PROBLEM — G47.09 OTHER INSOMNIA: Status: RESOLVED | Noted: 2022-09-08 | Resolved: 2023-01-23

## 2023-01-23 PROBLEM — G47.8 NON-RESTORATIVE SLEEP: Status: RESOLVED | Noted: 2022-12-21 | Resolved: 2023-01-23

## 2023-01-23 PROBLEM — G47.10 HYPERSOMNIA: Status: RESOLVED | Noted: 2022-12-21 | Resolved: 2023-01-23

## 2023-01-23 PROCEDURE — 99214 OFFICE O/P EST MOD 30 MIN: CPT | Performed by: FAMILY MEDICINE

## 2023-01-23 RX ORDER — ALBUTEROL SULFATE 90 UG/1
2 AEROSOL, METERED RESPIRATORY (INHALATION) EVERY 4 HOURS PRN
COMMUNITY
End: 2023-04-04 | Stop reason: SDUPTHER

## 2023-01-23 NOTE — ASSESSMENT & PLAN NOTE
Patient's (Body mass index is 47.82 kg/m².) indicates that they are obese (BMI >30) with health conditions that include obstructive sleep apnea . Weight is newly identified. BMI is is above average; BMI management plan is completed. We discussed low calorie, low carb based diet program, portion control and decreasing alcohol consumption.

## 2023-01-23 NOTE — PROGRESS NOTES
Chief Complaint  Emphysema (Follow up)    Subjective          Rusty Benitez presents to [facility]  History of Present Illness  --LIPIDS WERE OK ON DIETARY EFFORTS ONLY  --NEW CPAP IS PENDING, SEEING SLEEP SPECIALIST  --BREATHING IS STABLE ON THE INHALED ALBUTEROL BUT HE IS HAVING TROUBLE GETTING IT FOR THE NEBULIZER  --THE LEG SWELLING IS IMPROVED  --SIGNIFICANTLY OBESE, ASKING FOR DIETARY ADVICE  --HGA1C WAS 6.6 %        Allergies   Allergen Reactions   • Latex Rash        Health Maintenance Due   Topic Date Due   • COLORECTAL CANCER SCREENING  Never done   • Pneumococcal Vaccine 0-64 (1 - PCV) Never done   • ZOSTER VACCINE (1 of 2) Never done   • LUNG CANCER SCREENING  Never done   • ANNUAL WELLNESS VISIT  06/03/2022        Current Outpatient Medications on File Prior to Visit   Medication Sig   • albuterol (ACCUNEB) 0.63 MG/3ML nebulizer solution Take 3 mL by nebulization Every 4 (Four) Hours As Needed for Wheezing.   • albuterol sulfate  (90 Base) MCG/ACT inhaler Inhale 2 puffs Every 4 (Four) Hours As Needed for Wheezing.   • doxylamine (Sleep Aid) 25 MG tablet Take 25 mg by mouth At Night As Needed for Sleep.   • ibuprofen (ADVIL,MOTRIN) 200 MG tablet Take 200 mg by mouth Every 6 (Six) Hours As Needed for Mild Pain.   • [DISCONTINUED] zolpidem (AMBIEN) 5 MG tablet Take 1 tablet by mouth Take As Directed for 2 doses. Bring the medication to the sleep lab. DO NOT USE AT HOME     No current facility-administered medications on file prior to visit.       Immunization History   Administered Date(s) Administered   • COVID-19 (PFIZER) PURPLE CAP 04/01/2021, 04/29/2021   • FluLaval/Fluzone >6mos 01/03/2023       Review of Systems   Constitutional: Positive for fatigue. Negative for activity change, appetite change, chills and fever.   HENT: Negative for congestion, ear pain, rhinorrhea and sore throat.    Respiratory: Negative for cough and shortness of breath.   "  Cardiovascular: Negative for chest pain, palpitations and leg swelling.   Gastrointestinal: Negative for abdominal pain, constipation, diarrhea, nausea and vomiting.   Musculoskeletal: Negative for arthralgias and myalgias.   Neurological: Negative for headache.        Objective     /84 (BP Location: Left arm, Patient Position: Sitting)   Pulse 85   Ht 182.9 cm (72\")   Wt (!) 160 kg (352 lb 9.6 oz)   SpO2 94% Comment: on room air  BMI 47.82 kg/m²       Physical Exam  Vitals and nursing note reviewed.   Constitutional:       General: He is not in acute distress.     Appearance: Normal appearance. He is obese.   Cardiovascular:      Rate and Rhythm: Normal rate and regular rhythm.      Heart sounds: Normal heart sounds. No murmur heard.  Pulmonary:      Effort: Pulmonary effort is normal.      Breath sounds: Normal breath sounds.   Abdominal:      Palpations: Abdomen is soft.      Tenderness: There is no abdominal tenderness.   Musculoskeletal:      Cervical back: Neck supple.      Right lower leg: Edema present.      Left lower leg: Edema present.      Comments: TWO PLUS   Lymphadenopathy:      Cervical: No cervical adenopathy.   Neurological:      General: No focal deficit present.      Mental Status: He is alert.      Cranial Nerves: No cranial nerve deficit.      Coordination: Coordination normal.      Gait: Gait normal.   Psychiatric:         Mood and Affect: Mood normal.         Behavior: Behavior normal.         Result Review :                             Assessment and Plan      Diagnoses and all orders for this visit:    1. Other emphysema (HCC) (Primary)  Assessment & Plan:  COPD is unchanged.  Discussed monitoring symptoms and use of quick-relief medications and contacting us early in the course of exacerbations.  Counseled to avoid exposure to cigarette smoke.          2. History of high cholesterol  Assessment & Plan:  DOING WELL CURRENTLY WITHOUT MEDS       3. Obstructive sleep " apnea  Assessment & Plan:  SLEEP SPECIALIST'S NOTES REVIEWED.  PROCEED WITH CPAP INITIATION AS PLANNED       4. Prediabetes  Assessment & Plan:  THIS IS A NEW FINDING.  DIETARY ADVICE AS NOTED ELSEWHERE.  WILL RECHECK AT A LATER DATE       5. Venous insufficiency  Assessment & Plan:  IMPROVED WITH CURRENT TREATMENT, CONTINUE SAME, WILL REEVALUATE AT NEXT VISIT       6. Morbid (severe) obesity due to excess calories (HCC)  Assessment & Plan:  Patient's (Body mass index is 47.82 kg/m².) indicates that they are obese (BMI >30) with health conditions that include obstructive sleep apnea . Weight is newly identified. BMI is is above average; BMI management plan is completed. We discussed low calorie, low carb based diet program, portion control and decreasing alcohol consumption.             Follow Up     Return in about 6 months (around 7/23/2023).    Patient was given instructions and counseling regarding his condition or for health maintenance advice. Please see specific information pulled into the AVS if appropriate.

## 2023-01-24 ENCOUNTER — TELEPHONE (OUTPATIENT)
Dept: SLEEP MEDICINE | Facility: HOSPITAL | Age: 63
End: 2023-01-24
Payer: MEDICARE

## 2023-02-03 ENCOUNTER — TELEPHONE (OUTPATIENT)
Dept: SLEEP MEDICINE | Facility: HOSPITAL | Age: 63
End: 2023-02-03
Payer: MEDICARE

## 2023-02-03 NOTE — TELEPHONE ENCOUNTER
Pt called today regarding his CPAP causing dry mouth and severe claustrophobia.  Pt would like to speak to the dr about medication to help with anxiety of wearing mask.  I pulled up d/l.  Changed pt humidification level and tube temp. Asked pt to call José Luis about his mask and possibility of different type.

## 2023-02-15 ENCOUNTER — TELEPHONE (OUTPATIENT)
Dept: SLEEP MEDICINE | Facility: HOSPITAL | Age: 63
End: 2023-02-15
Payer: MEDICARE

## 2023-02-15 NOTE — TELEPHONE ENCOUNTER
Pt called in to see what else can be done with his claustrophobia and not being able to sleep with his CPAP.  He is going to see if he can get transportation to the sleep center to be able to see .  He will call back if he can find a way to come in and see Dr. Little

## 2023-04-04 ENCOUNTER — OFFICE VISIT (OUTPATIENT)
Dept: FAMILY MEDICINE CLINIC | Age: 63
End: 2023-04-04
Payer: MEDICARE

## 2023-04-04 VITALS
WEIGHT: 243.6 LBS | SYSTOLIC BLOOD PRESSURE: 118 MMHG | OXYGEN SATURATION: 96 % | BODY MASS INDEX: 33 KG/M2 | DIASTOLIC BLOOD PRESSURE: 70 MMHG | HEART RATE: 80 BPM | HEIGHT: 72 IN

## 2023-04-04 DIAGNOSIS — Z78.9 NEED FOR FOLLOW-UP BY SOCIAL WORKER: ICD-10-CM

## 2023-04-04 DIAGNOSIS — Z59.82 LACK OF ACCESS TO TRANSPORTATION: ICD-10-CM

## 2023-04-04 DIAGNOSIS — F41.9 ANXIETY AND DEPRESSION: Primary | ICD-10-CM

## 2023-04-04 DIAGNOSIS — F32.A ANXIETY AND DEPRESSION: Primary | ICD-10-CM

## 2023-04-04 DIAGNOSIS — J43.8 OTHER EMPHYSEMA: ICD-10-CM

## 2023-04-04 RX ORDER — HYDROXYZINE HYDROCHLORIDE 25 MG/1
25 TABLET, FILM COATED ORAL
Qty: 90 TABLET | Refills: 0 | Status: SHIPPED | OUTPATIENT
Start: 2023-04-04

## 2023-04-04 RX ORDER — ALBUTEROL SULFATE 90 UG/1
2 AEROSOL, METERED RESPIRATORY (INHALATION) EVERY 4 HOURS PRN
Qty: 6.7 G | Refills: 0 | Status: SHIPPED | OUTPATIENT
Start: 2023-04-04

## 2023-04-04 RX ORDER — ESCITALOPRAM OXALATE 10 MG/1
10 TABLET ORAL DAILY
Qty: 90 TABLET | Refills: 0 | Status: SHIPPED | OUTPATIENT
Start: 2023-04-04

## 2023-04-04 RX ORDER — ALBUTEROL SULFATE 0.63 MG/3ML
1 SOLUTION RESPIRATORY (INHALATION) EVERY 4 HOURS PRN
Qty: 180 EACH | Refills: 0 | Status: SHIPPED | OUTPATIENT
Start: 2023-04-04

## 2023-04-04 SDOH — ECONOMIC STABILITY - TRANSPORTATION SECURITY: TRANSPORTATION INSECURITY: Z59.82

## 2023-04-04 NOTE — PROGRESS NOTES
"Rusty Benitez presents to [facility] with complaint of  Anxiety (Pt states he is unable to wear his sleep apnea mask due to his severe anxiety)    SUBJECTIVE  History of Present Illness     Patient is here today to discuss anxiety and depression.  Patient says he has battled depression most of his life.  He says he was on medication in the past but quit taking it as he felt it was not beneficial for him, and caused too many side effects.  He is not able to recall what medication this was.  Patient's biggest concern is anxiety and claustrophobic sensation whenever he wears his CPAP.  Patient has severe sleep apnea and understands that he needs to start wearing his CPAP but he has not been able to do so as he feels like he is suffocating when he wears his CPAP machine.  He is wanting a medication to help with this.  Patient also has emphysema and is asking for refills of albuterol nebulizer and rescue inhaler.  He denies any shortness of air or issues with his breathing today.  Patient says he has a hard time getting to his appointments due to transportation issues.  He is afraid he will have to cancel his upcoming sleep medicine appointment on April 24.    OBJECTIVE  Vital Signs:   /70   Pulse 80   Ht 182.9 cm (72\")   Wt 110 kg (243 lb 9.6 oz)   SpO2 96% Comment: room air  BMI 33.04 kg/m²       Physical Exam  Constitutional:       General: He is not in acute distress.     Appearance: Normal appearance. He is not ill-appearing.   HENT:      Head: Normocephalic and atraumatic.      Nose: Nose normal.      Mouth/Throat:      Pharynx: Oropharynx is clear.   Cardiovascular:      Rate and Rhythm: Normal rate and regular rhythm.      Pulses: Normal pulses.      Heart sounds: Normal heart sounds.   Pulmonary:      Effort: Pulmonary effort is normal. No respiratory distress.      Breath sounds: Examination of the right-upper field reveals wheezing. Examination of the left-upper " field reveals wheezing. Wheezing present.   Chest:      Chest wall: No tenderness.   Musculoskeletal:         General: Normal range of motion.      Cervical back: Normal range of motion and neck supple.   Skin:     General: Skin is warm and dry.   Neurological:      General: No focal deficit present.      Mental Status: He is alert and oriented to person, place, and time. Mental status is at baseline.   Psychiatric:         Mood and Affect: Mood normal.         Behavior: Behavior normal.            ASSESSMENT AND PLAN:  Diagnoses and all orders for this visit:    1. Anxiety and depression (Primary)  -     escitalopram (Lexapro) 10 MG tablet; Take 1 tablet by mouth Daily.  Dispense: 90 tablet; Refill: 0  -     hydrOXYzine (ATARAX) 25 MG tablet; Take 1 tablet by mouth every night at bedtime.  Dispense: 90 tablet; Refill: 0    2. Lack of access to transportation  -     Ambulatory Referral to Social Care Services (Amb Case Mgmt)    3. Need for follow-up by   -     Ambulatory Referral to Social Care Services (Amb Case Mgmt)    4. Other emphysema  -     albuterol (ACCUNEB) 0.63 MG/3ML nebulizer solution; Take 3 mL by nebulization Every 4 (Four) Hours As Needed for Wheezing.  Dispense: 180 each; Refill: 0  -     albuterol sulfate  (90 Base) MCG/ACT inhaler; Inhale 2 puffs Every 4 (Four) Hours As Needed for Wheezing.  Dispense: 6.7 g; Refill: 0      Patient was started on Lexapro 10 mg once per day.  Patient was educated on med side effects.  Patient was also given hydroxyzine to take 30 minutes to 1 hour before bedtime.  Hydroxyzine was given to hopefully calm him before he wears his CPAP machine.   consult placed due to transportation issues.  He was given refills of his albuterol for his emphysema.  Patient was instructed to follow-up with PCP as scheduled for reassessment of his mental health.    Follow Up   Return for Next scheduled follow up. Patient to notify office with any acute  concerns or issues.  Patient verbalizes understanding, agrees with plan of care and has no further questions upon discharge.     Patient was given instructions and counseling regarding his condition or for health maintenance advice. Please see specific information pulled into the AVS if appropriate.     Discussed the importance of following up with any needed screening tests/labs/specialist appointments and any requested follow-up recommended by me today. Importance of maintaining follow-up discussed and patient accepts that missed appointments can delay diagnosis and potentially lead to worsening of conditions.    Part of this note may be an electronic transcription/translation of spoken language to printed text using the Dragon Dictation System.

## 2023-04-05 ENCOUNTER — REFERRAL TRIAGE (OUTPATIENT)
Dept: CASE MANAGEMENT | Facility: OTHER | Age: 63
End: 2023-04-05
Payer: MEDICARE

## 2023-04-06 ENCOUNTER — PATIENT OUTREACH (OUTPATIENT)
Dept: CASE MANAGEMENT | Facility: OTHER | Age: 63
End: 2023-04-06
Payer: MEDICARE

## 2023-04-06 NOTE — OUTREACH NOTE
Social Work Assessment  Questions/Answers    Flowsheet Row Most Recent Value   Referral Source physician   Reason for Consult community resources, transportation   People in Home spouse   Current Living Arrangements home   Primary Care Provided by self, spouse/significant other   Provides Primary Care For no one, unable/limited ability to care for self   Family Caregiver if Needed spouse   Quality of Family Relationships helpful, supportive   Source of Income social security, disability   Application for Public Assistance obtained public assistance pending number        SDOH updated and reviewed with the patient during this program:  Financial Resource Strain: Low Risk    • Difficulty of Paying Living Expenses: Not very hard      Food Insecurity: No Food Insecurity   • Worried About Running Out of Food in the Last Year: Never true   • Ran Out of Food in the Last Year: Never true      Transportation Needs: Unmet Transportation Needs   • Lack of Transportation (Medical): Yes   • Lack of Transportation (Non-Medical): Yes      Housing Stability: Low Risk    • Unable to Pay for Housing in the Last Year: No   • Number of Places Lived in the Last Year: 1   • Unstable Housing in the Last Year: No     Patient Outreach    MSW spoke with patient to discuss resources needed. Patient states that he doesn't have [facility] transportation benefits. MSW encouraged patient to inquire to sign up for transportation. [employer] is outside of transportation mileage and would be private pay. MSW encouraged patient to call [facility] as well regarding in home supports if needed. MSW also encouraged patient to call [facility] for in home provider. Patient states that he was considering this or moving to in town provider closer to home due to transportation concerns.    Xenia PEREZ -   Ambulatory Case Management    4/6/2023, 14:32 EDT

## 2023-05-30 ENCOUNTER — OFFICE VISIT (OUTPATIENT)
Dept: FAMILY MEDICINE CLINIC | Age: 63
End: 2023-05-30

## 2023-05-30 VITALS
HEIGHT: 72 IN | DIASTOLIC BLOOD PRESSURE: 84 MMHG | HEART RATE: 86 BPM | WEIGHT: 315 LBS | OXYGEN SATURATION: 95 % | SYSTOLIC BLOOD PRESSURE: 134 MMHG | BODY MASS INDEX: 42.66 KG/M2

## 2023-05-30 DIAGNOSIS — F17.210 CIGARETTE SMOKER: ICD-10-CM

## 2023-05-30 DIAGNOSIS — J43.8 OTHER EMPHYSEMA: Primary | ICD-10-CM

## 2023-05-30 DIAGNOSIS — G47.33 OBSTRUCTIVE SLEEP APNEA: ICD-10-CM

## 2023-05-30 DIAGNOSIS — F34.1 DYSTHYMIC DISORDER: ICD-10-CM

## 2023-05-30 DIAGNOSIS — Z12.11 COLON CANCER SCREENING: ICD-10-CM

## 2023-05-30 DIAGNOSIS — E78.00 ELEVATED CHOLESTEROL: ICD-10-CM

## 2023-05-30 DIAGNOSIS — R73.03 PREDIABETES: ICD-10-CM

## 2023-05-30 PROBLEM — E66.01 MORBID (SEVERE) OBESITY DUE TO EXCESS CALORIES: Status: RESOLVED | Noted: 2022-12-21 | Resolved: 2023-05-30

## 2023-05-30 PROCEDURE — 99214 OFFICE O/P EST MOD 30 MIN: CPT | Performed by: FAMILY MEDICINE

## 2023-05-30 PROCEDURE — 1160F RVW MEDS BY RX/DR IN RCRD: CPT | Performed by: FAMILY MEDICINE

## 2023-05-30 PROCEDURE — 1159F MED LIST DOCD IN RCRD: CPT | Performed by: FAMILY MEDICINE

## 2023-05-30 RX ORDER — HYDROXYZINE HYDROCHLORIDE 25 MG/1
50 TABLET, FILM COATED ORAL
Qty: 180 TABLET | Refills: 1 | Status: SHIPPED | OUTPATIENT
Start: 2023-05-30

## 2023-05-30 RX ORDER — ESCITALOPRAM OXALATE 20 MG/1
10 TABLET ORAL DAILY
Qty: 90 TABLET | Refills: 1 | Status: SHIPPED | OUTPATIENT
Start: 2023-05-30

## 2023-05-30 RX ORDER — ALBUTEROL SULFATE 90 UG/1
2 AEROSOL, METERED RESPIRATORY (INHALATION) EVERY 4 HOURS PRN
Qty: 54 G | Refills: 1 | Status: SHIPPED | OUTPATIENT
Start: 2023-05-30

## 2023-05-30 RX ORDER — ALBUTEROL SULFATE 0.63 MG/3ML
1 SOLUTION RESPIRATORY (INHALATION) EVERY 4 HOURS PRN
Qty: 360 EACH | Refills: 1 | Status: SHIPPED | OUTPATIENT
Start: 2023-05-30

## 2023-05-30 NOTE — ASSESSMENT & PLAN NOTE
Patient's depression is single episode and is moderate without psychosis. Their depression is currently in partial remission and the condition is unchanged. This will be reassessed at the next regular appointment. F/U as described:patient was prescribed an antidepressant medicine   WILL INCREASE THE LEXAPRO PER HIS REQUEST .

## 2023-05-30 NOTE — PROGRESS NOTES
Chief Complaint  Emphysema (Follow up)    Subjective          Rusty Benitez presents to [facility]  History of Present Illness  --BREATHING IS STABLE ON CURRENT INHALED MEDS  --LIPIDS WERE BORDERLINE ON DIETARY EFFORTS ONLY  --HGA1C WAS 6 % WITHOUT MEDS  --IS NOW USING CPAP AGAIN WITH GOOD RESULTS  --SLEEPING BETTER WITH THE VISTARIL BUT WOULD LIKE TO TRY A HIGHER DOSE OF LEXAPRO DURING THE DAY  --DUE FOR A SCREENING COLONOSCOPY AND A SCREENING CHEST CT        Allergies   Allergen Reactions   • Latex Rash        Health Maintenance Due   Topic Date Due   • COLORECTAL CANCER SCREENING  Never done   • Pneumococcal Vaccine 0-64 (1 - PCV) Never done   • ZOSTER VACCINE (1 of 2) Never done   • LUNG CANCER SCREENING  Never done   • ANNUAL WELLNESS VISIT  Never done        Current Outpatient Medications on File Prior to Visit   Medication Sig   • ibuprofen (ADVIL,MOTRIN) 200 MG tablet Take 1 tablet by mouth Every 6 (Six) Hours As Needed for Mild Pain.   • [DISCONTINUED] albuterol (ACCUNEB) 0.63 MG/3ML nebulizer solution Take 3 mL by nebulization Every 4 (Four) Hours As Needed for Wheezing.   • [DISCONTINUED] albuterol sulfate  (90 Base) MCG/ACT inhaler Inhale 2 puffs Every 4 (Four) Hours As Needed for Wheezing.   • [DISCONTINUED] escitalopram (Lexapro) 10 MG tablet Take 1 tablet by mouth Daily.   • [DISCONTINUED] hydrOXYzine (ATARAX) 25 MG tablet Take 1 tablet by mouth every night at bedtime.   • doxylamine (Sleep Aid) 25 MG tablet Take 1 tablet by mouth At Night As Needed for Sleep.     No current facility-administered medications on file prior to visit.       Immunization History   Administered Date(s) Administered   • COVID-19 (PFIZER) Purple Cap Monovalent 04/01/2021, 04/29/2021   • FluLaval/Fluzone >6mos 01/03/2023       Review of Systems   Constitutional: Negative for activity change, appetite change, chills, fatigue and fever.   HENT: Negative for congestion, ear pain,  "rhinorrhea and sore throat.    Respiratory: Negative for cough and shortness of breath.    Cardiovascular: Negative for chest pain, palpitations and leg swelling.   Gastrointestinal: Negative for abdominal pain, constipation, diarrhea, nausea and vomiting.   Musculoskeletal: Negative for arthralgias and myalgias.   Neurological: Negative for headache.        Objective     /84 (BP Location: Left arm, Patient Position: Sitting)   Pulse 86   Ht 182.9 cm (72\")   Wt (!) 152 kg (334 lb 6.4 oz)   SpO2 95% Comment: on room air  BMI 45.35 kg/m²       Physical Exam  Vitals and nursing note reviewed.   Constitutional:       General: He is not in acute distress.     Appearance: Normal appearance.   Cardiovascular:      Rate and Rhythm: Normal rate and regular rhythm.      Heart sounds: Normal heart sounds. No murmur heard.  Pulmonary:      Effort: Pulmonary effort is normal.      Breath sounds: Normal breath sounds.   Abdominal:      Palpations: Abdomen is soft.      Tenderness: There is no abdominal tenderness.   Musculoskeletal:      Cervical back: Neck supple.      Right lower leg: No edema.      Left lower leg: No edema.   Lymphadenopathy:      Cervical: No cervical adenopathy.   Neurological:      General: No focal deficit present.      Mental Status: He is alert.      Cranial Nerves: No cranial nerve deficit.      Coordination: Coordination normal.      Gait: Gait normal.   Psychiatric:         Mood and Affect: Mood normal.         Behavior: Behavior normal.         Result Review :                             Assessment and Plan      Diagnoses and all orders for this visit:    1. Other emphysema (Primary)  Assessment & Plan:  COPD is improving with treatment.  Discussed monitoring symptoms and use of quick-relief medications and contacting us early in the course of exacerbations.  Counseled to avoid exposure to cigarette smoke.        Orders:  -     albuterol (ACCUNEB) 0.63 MG/3ML nebulizer solution; Take 3 mL " by nebulization Every 4 (Four) Hours As Needed for Wheezing.  Dispense: 360 each; Refill: 1  -     albuterol sulfate  (90 Base) MCG/ACT inhaler; Inhale 2 puffs Every 4 (Four) Hours As Needed for Wheezing.  Dispense: 54 g; Refill: 1    2. Dysthymic disorder  Assessment & Plan:  Patient's depression is single episode and is moderate without psychosis. Their depression is currently in partial remission and the condition is unchanged. This will be reassessed at the next regular appointment. F/U as described:patient was prescribed an antidepressant medicine   WILL INCREASE THE LEXAPRO PER HIS REQUEST .    Orders:  -     escitalopram (Lexapro) 20 MG tablet; Take 0.5 tablets by mouth Daily.  Dispense: 90 tablet; Refill: 1  -     hydrOXYzine (ATARAX) 25 MG tablet; Take 2 tablets by mouth every night at bedtime.  Dispense: 180 tablet; Refill: 1    3. Elevated cholesterol  Assessment & Plan:  Lipid abnormalities are improving with lifestyle modifications.  Nutritional counseling was provided.  Lipids will be reassessed in 6 months.      4. Obstructive sleep apnea (CPAP)   Assessment & Plan:  IMPROVED WITH CURRENT TREATMENT, CONTINUE SAME, WILL REEVALUATE AT NEXT VISIT       5. Prediabetes  Assessment & Plan:  MILD, WILL RECHECK LABS AT NEXT VISIT       6. Cigarette smoker  -     CT Chest Low Dose Wo; Future    7. Colon cancer screening  -     Ambulatory Referral to General Surgery          Follow Up     Return in about 4 months (around 9/30/2023).    Patient was given instructions and counseling regarding his condition or for health maintenance advice. Please see specific information pulled into the AVS if appropriate.

## 2023-06-07 NOTE — TELEPHONE ENCOUNTER
Talked to wife, Sari, she said ok to send a order to [facility] for a manual wheelchair.   Order faxed.    no

## 2023-06-14 ENCOUNTER — HOSPITAL ENCOUNTER (OUTPATIENT)
Dept: CT IMAGING | Facility: HOSPITAL | Age: 63
Discharge: HOME | End: 2023-06-14
Admitting: FAMILY MEDICINE
Payer: MEDICARE

## 2023-06-14 DIAGNOSIS — F17.210 CIGARETTE SMOKER: ICD-10-CM

## 2023-06-14 PROCEDURE — 71271 CT THORAX LUNG CANCER SCR C-: CPT

## 2023-06-17 ENCOUNTER — DOCUMENTATION (OUTPATIENT)
Dept: FAMILY MEDICINE CLINIC | Age: 63
End: 2023-06-17
Payer: MEDICARE

## 2023-06-17 RX ORDER — CEPHALEXIN 500 MG/1
500 CAPSULE ORAL 3 TIMES DAILY
Qty: 30 CAPSULE | Refills: 0 | Status: SHIPPED | OUTPATIENT
Start: 2023-06-17 | End: 2023-06-27

## 2023-06-19 ENCOUNTER — TELEPHONE (OUTPATIENT)
Dept: FAMILY MEDICINE CLINIC | Age: 63
End: 2023-06-19
Payer: MEDICARE

## 2023-07-25 ENCOUNTER — TELEPHONE (OUTPATIENT)
Dept: FAMILY MEDICINE CLINIC | Age: 63
End: 2023-07-25

## 2023-07-25 ENCOUNTER — OFFICE VISIT (OUTPATIENT)
Dept: SURGERY | Facility: CLINIC | Age: 63
End: 2023-07-25
Payer: MEDICARE

## 2023-07-25 VITALS — BODY MASS INDEX: 42.66 KG/M2 | HEIGHT: 72 IN | WEIGHT: 315 LBS | RESPIRATION RATE: 20 BRPM

## 2023-07-25 DIAGNOSIS — Z12.11 COLON CANCER SCREENING: Primary | ICD-10-CM

## 2023-07-25 DIAGNOSIS — R91.8 INFILTRATE OF LOWER LOBE OF RIGHT LUNG PRESENT ON IMAGING STUDY: Primary | ICD-10-CM

## 2023-07-25 PROCEDURE — 1159F MED LIST DOCD IN RCRD: CPT | Performed by: SURGERY

## 2023-07-25 PROCEDURE — 1160F RVW MEDS BY RX/DR IN RCRD: CPT | Performed by: SURGERY

## 2023-07-25 PROCEDURE — S0260 H&P FOR SURGERY: HCPCS | Performed by: SURGERY

## 2023-07-25 NOTE — PROGRESS NOTES
"Inpatient History and Physical Surgical Orders    Preadmission Location:   Preadmission Time:  Facility:  Surgery Date:  Surgery Time:  Preadmission Test date:     Chief Complaint  Outpatient History and Physical / Surgical Orders    Primary Care Provider: Franklin Ruth MD    Referring Provider: Franklin Ruth*    Subjective      Patient Name: Rusty Benitez : 1960    HPI  The patient is a 62-year-old gentleman that was referred for colon cancer screening.  He denies having a prior colonoscopy.  He denies any rectal bleeding.    Past History:  Medical History: has no past medical history on file.   Surgical History: has a past surgical history that includes Tonsillectomy and adenoidectomy; Uvulopalatopharyngoplasty; Cholecystectomy; Knee arthroscopy (Right); Nasal septum surgery; and Cataract extraction (Bilateral).   Family History: family history is not on file.   Social History: reports that he has been smoking cigarettes. He has a 21.00 pack-year smoking history. He has never used smokeless tobacco. He reports current alcohol use of about 1.0 standard drink per week. Drug use questions deferred to the physician.  Allergies: Latex       Current Outpatient Medications:     albuterol (ACCUNEB) 0.63 MG/3ML nebulizer solution, Take 3 mL by nebulization Every 4 (Four) Hours As Needed for Wheezing., Disp: 360 each, Rfl: 1    albuterol sulfate  (90 Base) MCG/ACT inhaler, Inhale 2 puffs Every 4 (Four) Hours As Needed for Wheezing., Disp: 54 g, Rfl: 1    escitalopram (Lexapro) 20 MG tablet, Take 0.5 tablets by mouth Daily., Disp: 90 tablet, Rfl: 1    hydrOXYzine (ATARAX) 25 MG tablet, Take 2 tablets by mouth every night at bedtime., Disp: 180 tablet, Rfl: 1    ibuprofen (ADVIL,MOTRIN) 200 MG tablet, Take 1 tablet by mouth Every 6 (Six) Hours As Needed for Mild Pain., Disp: , Rfl:        Objective   Vital Signs:   Resp 20   Ht 182.9 cm (72.01\")   Wt (!) 152 kg (336 lb)   BMI 45.56 " kg/m²       Physical Exam  Vitals and nursing note reviewed.   Constitutional:       Appearance: Normal appearance. The patient is well-developed.   Cardiovascular:      Rate and Rhythm: Normal rate and regular rhythm.   Pulmonary:      Effort: Pulmonary effort is normal.      Breath sounds: Normal air entry.   Abdominal:      General: Bowel sounds are normal.      Palpations: Abdomen is soft.      Skin:     General: Skin is warm and dry.   Neurological:      Mental Status: The patient is alert and oriented to person, place, and time.      Motor: Motor function is intact.   Psychiatric:         Mood and Affect: Mood normal.       Result Review :               Assessment and Plan   Diagnoses and all orders for this visit:    1. Colon cancer screening (Primary)  -     Obtain Informed Consent; Standing  -     Verify NPO; Standing  -     Case Request; Standing  -     Case Request    We will schedule him for a colonoscopy.  I have described the procedure to him as well as the risk and benefits and he is agreeable to proceeding.    I  Bola Arevalo MD  07/25/2023

## 2023-07-25 NOTE — TELEPHONE ENCOUNTER
CT Chest Low Dose Cancer Screening WO (06/14/2023 12:32)      Franklin Ruth MD  6/17/2023 11:24 AM EDT       Some cloudiness in the right lung, which may be due to infection. I’ve sent an anabiotic to [facility]. Repeat this same scan in two months.

## 2023-07-25 NOTE — TELEPHONE ENCOUNTER
Caller: CORNELIA HUNG    Relationship: Emergency Contact    Best call back number: [phone]     Who are you requesting to speak with (clinical staff, provider,  specific staff member): MEDICAL STAFF    What was the call regarding: PATIENTS WIFE WOULD LIKE TO KNOW IF THE PATIENT CAN GO AHEAD AND GET HIS FOLLOW UP CT SCAN DONE. PLEASE CALL TO LET HER KNOW IF THE ORDER IS IN THE SYSTEM AND IF THE PATIENT CAN GO TO [facility] TO COMPLETE THE SCAN.

## 2023-08-09 ENCOUNTER — HOSPITAL ENCOUNTER (OUTPATIENT)
Dept: CT IMAGING | Facility: HOSPITAL | Age: 63
Discharge: HOME | End: 2023-08-09
Admitting: FAMILY MEDICINE
Payer: MEDICARE

## 2023-08-09 DIAGNOSIS — R91.8 INFILTRATE OF LOWER LOBE OF RIGHT LUNG PRESENT ON IMAGING STUDY: ICD-10-CM

## 2023-08-09 PROCEDURE — 71250 CT THORAX DX C-: CPT

## 2023-09-18 ENCOUNTER — TELEPHONE (OUTPATIENT)
Dept: FAMILY MEDICINE CLINIC | Age: 63
End: 2023-09-18
Payer: MEDICARE

## 2023-09-18 DIAGNOSIS — F34.1 DYSTHYMIC DISORDER: ICD-10-CM

## 2023-09-18 NOTE — TELEPHONE ENCOUNTER
"  Caller: Rusty Benitez \"Jimmie\"    Relationship: Self    Best call back number: [phone]     What was the call regarding: PATIENT STATES HE WOULD LIKE TO KNOW IF HE IS SUPPOSED TO BE TAKING 1.5 OR 2 TABLETS OF THE LEXAPRO.  "

## 2023-09-19 NOTE — TELEPHONE ENCOUNTER
Pt calling questioning Lexapro dose.  Med list has lexapro 20mg take 1/2 tablet daily.      In 05/30/23 office note:       Dysthymic disorder  Assessment & Plan:  Patient's depression is single episode and is moderate without psychosis. Their depression is currently in partial remission and the condition is unchanged. This will be reassessed at the next regular appointment. F/U as described:patient was prescribed an antidepressant medicine   WILL INCREASE THE LEXAPRO PER HIS REQUEST .     Please advise

## 2023-09-22 RX ORDER — ESCITALOPRAM OXALATE 20 MG/1
20 TABLET ORAL DAILY
Qty: 90 TABLET | Refills: 1 | Status: SHIPPED | OUTPATIENT
Start: 2023-09-22 | End: 2023-09-25 | Stop reason: SDUPTHER

## 2023-09-25 ENCOUNTER — OFFICE VISIT (OUTPATIENT)
Dept: FAMILY MEDICINE CLINIC | Age: 63
End: 2023-09-25

## 2023-09-25 VITALS
TEMPERATURE: 98.2 F | HEART RATE: 75 BPM | WEIGHT: 315 LBS | OXYGEN SATURATION: 95 % | SYSTOLIC BLOOD PRESSURE: 129 MMHG | DIASTOLIC BLOOD PRESSURE: 76 MMHG | BODY MASS INDEX: 42.66 KG/M2 | HEIGHT: 72 IN

## 2023-09-25 DIAGNOSIS — Z12.5 SCREENING FOR PROSTATE CANCER: ICD-10-CM

## 2023-09-25 DIAGNOSIS — J43.8 OTHER EMPHYSEMA: ICD-10-CM

## 2023-09-25 DIAGNOSIS — Z00.00 MEDICARE ANNUAL WELLNESS VISIT, SUBSEQUENT: Primary | ICD-10-CM

## 2023-09-25 DIAGNOSIS — F34.1 DYSTHYMIC DISORDER: ICD-10-CM

## 2023-09-25 DIAGNOSIS — R91.8 LUNG NODULES: ICD-10-CM

## 2023-09-25 DIAGNOSIS — R73.03 PREDIABETES: ICD-10-CM

## 2023-09-25 DIAGNOSIS — E78.00 ELEVATED CHOLESTEROL: ICD-10-CM

## 2023-09-25 PROBLEM — Z12.11 COLON CANCER SCREENING: Status: RESOLVED | Noted: 2023-07-25 | Resolved: 2023-09-25

## 2023-09-25 PROCEDURE — 1160F RVW MEDS BY RX/DR IN RCRD: CPT | Performed by: FAMILY MEDICINE

## 2023-09-25 PROCEDURE — 1159F MED LIST DOCD IN RCRD: CPT | Performed by: FAMILY MEDICINE

## 2023-09-25 PROCEDURE — 1170F FXNL STATUS ASSESSED: CPT | Performed by: FAMILY MEDICINE

## 2023-09-25 PROCEDURE — 99214 OFFICE O/P EST MOD 30 MIN: CPT | Performed by: FAMILY MEDICINE

## 2023-09-25 PROCEDURE — G0402 INITIAL PREVENTIVE EXAM: HCPCS | Performed by: FAMILY MEDICINE

## 2023-09-25 RX ORDER — HYDROXYZINE HYDROCHLORIDE 25 MG/1
50 TABLET, FILM COATED ORAL
Qty: 180 TABLET | Refills: 1 | Status: SHIPPED | OUTPATIENT
Start: 2023-09-25

## 2023-09-25 RX ORDER — ESCITALOPRAM OXALATE 10 MG/1
20 TABLET ORAL DAILY
Qty: 90 TABLET | Refills: 3 | Status: SHIPPED | OUTPATIENT
Start: 2023-09-25

## 2023-09-25 NOTE — ASSESSMENT & PLAN NOTE
ADVICE GIVEN RE:  SEATBELT USE, ALCOHOL USE, HEALTHY DIET, ROUTINE EYE AND DENTAL EXAM, ROUTINE VACCINATIONS.    WILL HAVE COLONOSCOPY SOON  WILL COME BACK FOR A FLU SHOT AND COVID BOOSTER WHEN AVAILABLE

## 2023-09-25 NOTE — PROGRESS NOTES
The ABCs of the Annual Wellness Visit  Subsequent Medicare Wellness Visit    Subjective    Rusty Benitez is a 62 y.o. male who presents for a Subsequent Medicare Wellness Visit.    The following portions of the patient's history were reviewed and   updated as appropriate: allergies, current medications, past family history, past medical history, past social history, past surgical history, and problem list.    Compared to one year ago, the patient feels his physical   health is the same.    Compared to one year ago, the patient feels his mental   health is the same.    Recent Hospitalizations:  He was not admitted to the hospital during the last year.       Current Medical Providers:  Patient Care Team:  Franklin Ruth MD as PCP - General (Family Medicine)    Outpatient Medications Prior to Visit   Medication Sig Dispense Refill    albuterol (ACCUNEB) 0.63 MG/3ML nebulizer solution Take 3 mL by nebulization Every 4 (Four) Hours As Needed for Wheezing. 360 each 1    albuterol sulfate  (90 Base) MCG/ACT inhaler Inhale 2 puffs Every 4 (Four) Hours As Needed for Wheezing. 54 g 1    ibuprofen (ADVIL,MOTRIN) 200 MG tablet Take 1 tablet by mouth Every 6 (Six) Hours As Needed for Mild Pain.      escitalopram (Lexapro) 20 MG tablet Take 1 tablet by mouth Daily. 90 tablet 1    hydrOXYzine (ATARAX) 25 MG tablet Take 2 tablets by mouth every night at bedtime. 180 tablet 1     No facility-administered medications prior to visit.       No opioid medication identified on active medication list. I have reviewed chart for other potential  high risk medication/s and harmful drug interactions in the elderly.        Aspirin is not on active medication list.  Aspirin use is not indicated based on review of current medical condition/s. Risk of harm outweighs potential benefits.  .    Patient Active Problem List   Diagnosis    Elevated cholesterol    Dysthymic disorder    Other emphysema    Cigarette smoker     "Obstructive sleep apnea (CPAP)     Venous insufficiency    Prediabetes    Lung nodules (repeat CT due in 2024)    Medicare annual wellness visit, subsequent     Advance Care Planning   Advance Care Planning     Advance Directive is not on file.  ACP discussion was held with the patient during this visit. Patient does not have an advance directive, declines further assistance.     Objective    Vitals:    23 1622   BP: 129/76   BP Location: Left arm   Patient Position: Sitting   Cuff Size: Large Adult   Pulse: 75   Temp: 98.2 °F (36.8 °C)   TempSrc: Oral   SpO2: 95%   Weight: (!) 156 kg (344 lb 9.6 oz)   Height: 182.9 cm (72.01\")     Estimated body mass index is 46.72 kg/m² as calculated from the following:    Height as of this encounter: 182.9 cm (72.01\").    Weight as of this encounter: 156 kg (344 lb 9.6 oz).           Does the patient have evidence of cognitive impairment? No          HEALTH RISK ASSESSMENT    Smoking Status:  Social History     Tobacco Use   Smoking Status Every Day    Packs/day: 0.50    Years: 42.00    Pack years: 21.00    Types: Cigarettes    Passive exposure: Current   Smokeless Tobacco Never     Alcohol Consumption:  Social History     Substance and Sexual Activity   Alcohol Use Yes    Alcohol/week: 1.0 standard drink    Types: 1 Cans of beer per week     Fall Risk Screen:    DANITZA Fall Risk Assessment was completed, and patient is at HIGH risk for falls. Assessment completed on:2023    Depression Screenin/25/2023     4:24 PM   PHQ-2/PHQ-9 Depression Screening   Little Interest or Pleasure in Doing Things 0-->not at all   Feeling Down, Depressed or Hopeless 0-->not at all   PHQ-9: Brief Depression Severity Measure Score 0       Health Habits and Functional and Cognitive Screenin/25/2023     4:25 PM   Functional & Cognitive Status   Do you have difficulty preparing food and eating? No   Do you have difficulty bathing yourself, getting dressed or " grooming yourself? No   Do you have difficulty using the toilet? Yes   Do you have difficulty moving around from place to place? Yes   Do you have trouble with steps or getting out of a bed or a chair? Yes   Current Diet Well Balanced Diet   Dental Exam Not up to date   Eye Exam Not up to date   Exercise (times per week) 0 times per week   Current Exercises Include No Regular Exercise   Do you need help using the phone?  No   Are you deaf or do you have serious difficulty hearing?  Yes   Do you need help to go to places out of walking distance? Yes   Do you need help shopping? Yes   Do you need help preparing meals?  No   Do you need help with housework?  Yes   Do you need help with laundry? Yes   Do you need help taking your medications? No   Do you need help managing money? No   Do you ever drive or ride in a car without wearing a seat belt? No   Have you felt unusual stress, anger or loneliness in the last month? Yes   Who do you live with? Spouse   If you need help, do you have trouble finding someone available to you? No   Have you been bothered in the last four weeks by sexual problems? No   Do you have difficulty concentrating, remembering or making decisions? No       Age-appropriate Screening Schedule:  Refer to the list below for future screening recommendations based on patient's age, sex and/or medical conditions. Orders for these recommended tests are listed in the plan section. The patient has been provided with a written plan.    Health Maintenance   Topic Date Due    COLORECTAL CANCER SCREENING  Never done    Pneumococcal Vaccine 0-64 (1 - PCV) Never done    TDAP/TD VACCINES (1 - Tdap) Never done    ZOSTER VACCINE (1 of 2) Never done    LIPID PANEL  09/16/2023    COVID-19 Vaccine (3 - Pfizer series) 09/17/2024 (Originally 6/24/2021)    INFLUENZA VACCINE  10/01/2023    BMI FOLLOWUP  01/23/2024    LUNG CANCER SCREENING  08/11/2024    ANNUAL WELLNESS VISIT  09/25/2024    HEPATITIS C SCREENING  Completed                   CMS Preventative Services Quick Reference  Risk Factors Identified During Encounter  Tobacco Use/Dependance Risk (use dotphrase .tobaccocessation for documentation)  The above risks/problems have been discussed with the patient.  Pertinent information has been shared with the patient in the After Visit Summary.  An After Visit Summary and PPPS were made available to the patient.    Follow Up:   Next Medicare Wellness visit to be scheduled in 1 year.       Additional E&M Note during same encounter follows:  Patient has multiple medical problems which are significant and separately identifiable that require additional work above and beyond the Medicare Wellness Visit.      Chief Complaint  Medicare Wellness-subsequent    Subjective        --LAST LIPIDS WERE OK WITH DIETARY EFFORTS ONLY  --CT LUNGS SHOWED RESOLVED PNEUMONIA BUT A NEW CLUSTER IN THE RUL, WILL HAVE A REPEAT SCAN IN JANUARY  --LAST HGA1C WAS 6.6 %  --BREATHING IS STABLE ON CURRENT INHALED MEDS, CONTINUES TO SMOKE  --THE DEPRESSION IS DOING WELL WITH THE LEXAPRO AND PRN VISTARIL     Rusty Benitez is also being seen today for BREATHING, SUGAR, CHOLESTEROL, CT OF CHEST     Review of Systems   Constitutional:  Negative for activity change, appetite change, chills, fatigue and fever.   HENT:  Negative for congestion, ear pain, hearing loss, rhinorrhea and sore throat.    Eyes:  Negative for discharge and visual disturbance.   Respiratory:  Negative for cough and shortness of breath.    Cardiovascular:  Negative for chest pain, palpitations and leg swelling.   Gastrointestinal:  Negative for abdominal pain, nausea and vomiting.   Genitourinary:  Negative for dysuria and hematuria.   Musculoskeletal:  Negative for arthralgias and myalgias.   Psychiatric/Behavioral:  Negative for dysphoric mood.      Objective   Vital Signs:  /76 (BP Location: Left arm, Patient Position: Sitting, Cuff Size: Large Adult)   Pulse 75   Temp 98.2 °F (36.8  "°C) (Oral)   Ht 182.9 cm (72.01\")   Wt (!) 156 kg (344 lb 9.6 oz)   SpO2 95%   BMI 46.72 kg/m²     Physical Exam  Vitals and nursing note reviewed.   Constitutional:       General: He is not in acute distress.     Appearance: Normal appearance.   HENT:      Right Ear: Tympanic membrane normal.      Left Ear: Tympanic membrane normal.      Mouth/Throat:      Pharynx: Oropharynx is clear.   Eyes:      Conjunctiva/sclera: Conjunctivae normal.   Cardiovascular:      Rate and Rhythm: Normal rate and regular rhythm.      Heart sounds: Normal heart sounds. No murmur heard.  Pulmonary:      Effort: Pulmonary effort is normal.      Breath sounds: Normal breath sounds.   Abdominal:      General: Bowel sounds are normal.      Palpations: Abdomen is soft.      Tenderness: There is no abdominal tenderness.   Musculoskeletal:      Cervical back: Neck supple.      Right lower leg: No edema.      Left lower leg: No edema.   Lymphadenopathy:      Cervical: No cervical adenopathy.   Neurological:      General: No focal deficit present.      Mental Status: He is alert.      Cranial Nerves: No cranial nerve deficit.      Coordination: Coordination normal.      Gait: Gait normal.   Psychiatric:         Mood and Affect: Mood normal.         Behavior: Behavior normal.        The following data was reviewed by: Franklin Ruth MD on 09/25/2023:    Data reviewed : Radiologic studies CHEST CT           Assessment and Plan   Diagnoses and all orders for this visit:    1. Medicare annual wellness visit, subsequent (Primary)  Assessment & Plan:  ADVICE GIVEN RE:  SEATBELT USE, ALCOHOL USE, HEALTHY DIET, ROUTINE EYE AND DENTAL EXAM, ROUTINE VACCINATIONS.    WILL HAVE COLONOSCOPY SOON  WILL COME BACK FOR A FLU SHOT AND COVID BOOSTER WHEN AVAILABLE       2. Elevated cholesterol  Assessment & Plan:  Lipid abnormalities are improving with lifestyle modifications.  Nutritional counseling was provided.  Lipids will be reassessed in 6 " months.    Orders:  -     Comprehensive Metabolic Panel; Future  -     Lipid Panel; Future    3. Lung nodules (repeat CT due in January of 2024)  Assessment & Plan:  THIS IS A NEW FINDING, WILL REPEAT CT IN JANUARY AS PLANNED       4. Other emphysema  Assessment & Plan:  COPD is improving with treatment.  Discussed monitoring symptoms and use of quick-relief medications and contacting us early in the course of exacerbations.  Counseled to avoid exposure to cigarette smoke.        Orders:  -     CBC (No Diff); Future    5. Prediabetes  Assessment & Plan:  WILL RECHECK LABS AS PLANNED     Orders:  -     TSH Rfx On Abnormal To Free T4; Future  -     Hemoglobin A1c; Future    6. Screening for prostate cancer  -     PSA Screen; Future    7. Dysthymic disorder  -     escitalopram (Lexapro) 10 MG tablet; Take 2 tablets by mouth Daily.  Dispense: 90 tablet; Refill: 3  -     hydrOXYzine (ATARAX) 25 MG tablet; Take 2 tablets by mouth every night at bedtime.  Dispense: 180 tablet; Refill: 1             Follow Up   Return in about 6 months (around 3/25/2024).  Patient was given instructions and counseling regarding his condition or for health maintenance advice. Please see specific information pulled into the AVS if appropriate.

## 2023-10-05 ENCOUNTER — CLINICAL SUPPORT (OUTPATIENT)
Dept: FAMILY MEDICINE CLINIC | Age: 63
End: 2023-10-05
Payer: MEDICARE

## 2023-10-05 DIAGNOSIS — Z23 NEED FOR IMMUNIZATION AGAINST INFLUENZA: Primary | ICD-10-CM

## 2023-10-30 ENCOUNTER — TELEPHONE (OUTPATIENT)
Dept: FAMILY MEDICINE CLINIC | Age: 63
End: 2023-10-30
Payer: MEDICARE

## 2023-10-30 NOTE — TELEPHONE ENCOUNTER
10/30/2023-1st attempt. Spoke with patient he is going to get his daughter to take him this week or next.

## 2023-10-30 NOTE — TELEPHONE ENCOUNTER
----- Message from Vianca Prado LPN sent at 10/30/2023  8:28 AM EDT -----      ----- Message -----  From: SYSTEM  Sent: 10/30/2023   1:50 AM EDT  To: [facility]

## 2023-11-06 NOTE — TELEPHONE ENCOUNTER
Spoke w/pt reminding of overdue lab orders from 09/25/2023. (2nd Attempt) [id number]+1d - Letter mailed.

## 2023-11-07 ENCOUNTER — LAB (OUTPATIENT)
Dept: LAB | Facility: HOSPITAL | Age: 63
End: 2023-11-07
Payer: MEDICARE

## 2023-11-07 DIAGNOSIS — Z12.5 SCREENING FOR PROSTATE CANCER: ICD-10-CM

## 2023-11-07 DIAGNOSIS — J43.8 OTHER EMPHYSEMA: ICD-10-CM

## 2023-11-07 DIAGNOSIS — E78.00 ELEVATED CHOLESTEROL: ICD-10-CM

## 2023-11-07 DIAGNOSIS — R73.03 PREDIABETES: ICD-10-CM

## 2023-11-07 LAB
ALBUMIN SERPL-MCNC: 3.9 G/DL (ref 3.5–5.2)
ALBUMIN/GLOB SERPL: 1.2 G/DL
ALP SERPL-CCNC: 68 U/L (ref 39–117)
ALT SERPL W P-5'-P-CCNC: 12 U/L (ref 1–41)
ANION GAP SERPL CALCULATED.3IONS-SCNC: 10.6 MMOL/L (ref 5–15)
AST SERPL-CCNC: 13 U/L (ref 1–40)
BILIRUB SERPL-MCNC: 0.4 MG/DL (ref 0–1.2)
BUN SERPL-MCNC: 17 MG/DL (ref 8–23)
BUN/CREAT SERPL: 18.9 (ref 7–25)
CALCIUM SPEC-SCNC: 9.4 MG/DL (ref 8.6–10.5)
CHLORIDE SERPL-SCNC: 100 MMOL/L (ref 98–107)
CHOLEST SERPL-MCNC: 198 MG/DL (ref 0–200)
CO2 SERPL-SCNC: 25.4 MMOL/L (ref 22–29)
CREAT SERPL-MCNC: 0.9 MG/DL (ref 0.76–1.27)
DEPRECATED RDW RBC AUTO: 48.6 FL (ref 37–54)
EGFRCR SERPLBLD CKD-EPI 2021: 96 ML/MIN/1.73
ERYTHROCYTE [DISTWIDTH] IN BLOOD BY AUTOMATED COUNT: 17 % (ref 12.3–15.4)
GLOBULIN UR ELPH-MCNC: 3.2 GM/DL
GLUCOSE SERPL-MCNC: 99 MG/DL (ref 65–99)
HBA1C MFR BLD: 6.2 % (ref 4.8–5.6)
HCT VFR BLD AUTO: 48.7 % (ref 37.5–51)
HDLC SERPL-MCNC: 35 MG/DL (ref 40–60)
HGB BLD-MCNC: 14.9 G/DL (ref 13–17.7)
LDLC SERPL CALC-MCNC: 134 MG/DL (ref 0–100)
LDLC/HDLC SERPL: 3.73 {RATIO}
MCH RBC QN AUTO: 25.1 PG (ref 26.6–33)
MCHC RBC AUTO-ENTMCNC: 30.6 G/DL (ref 31.5–35.7)
MCV RBC AUTO: 82.1 FL (ref 79–97)
PLATELET # BLD AUTO: 326 10*3/MM3 (ref 140–450)
PMV BLD AUTO: 8.6 FL (ref 6–12)
POTASSIUM SERPL-SCNC: 4.2 MMOL/L (ref 3.5–5.2)
PROT SERPL-MCNC: 7.1 G/DL (ref 6–8.5)
PSA SERPL-MCNC: 0.72 NG/ML (ref 0–4)
RBC # BLD AUTO: 5.93 10*6/MM3 (ref 4.14–5.8)
SODIUM SERPL-SCNC: 136 MMOL/L (ref 136–145)
TRIGL SERPL-MCNC: 162 MG/DL (ref 0–150)
TSH SERPL DL<=0.05 MIU/L-ACNC: 3.16 UIU/ML (ref 0.27–4.2)
VLDLC SERPL-MCNC: 29 MG/DL (ref 5–40)
WBC NRBC COR # BLD: 9.29 10*3/MM3 (ref 3.4–10.8)

## 2023-11-07 PROCEDURE — 80061 LIPID PANEL: CPT

## 2023-11-07 PROCEDURE — 83036 HEMOGLOBIN GLYCOSYLATED A1C: CPT

## 2023-11-07 PROCEDURE — 84443 ASSAY THYROID STIM HORMONE: CPT

## 2023-11-07 PROCEDURE — 85027 COMPLETE CBC AUTOMATED: CPT

## 2023-11-07 PROCEDURE — G0103 PSA SCREENING: HCPCS

## 2023-11-07 PROCEDURE — 36415 COLL VENOUS BLD VENIPUNCTURE: CPT

## 2023-11-07 PROCEDURE — 80053 COMPREHEN METABOLIC PANEL: CPT

## 2023-11-18 DIAGNOSIS — J43.8 OTHER EMPHYSEMA: ICD-10-CM

## 2023-11-20 RX ORDER — ALBUTEROL SULFATE 90 UG/1
2 AEROSOL, METERED RESPIRATORY (INHALATION) EVERY 4 HOURS PRN
Qty: 20.1 G | Refills: 1 | Status: SHIPPED | OUTPATIENT
Start: 2023-11-20

## 2023-12-15 PROBLEM — Z12.11 COLON CANCER SCREENING: Status: ACTIVE | Noted: 2023-07-25

## 2024-01-12 ENCOUNTER — ANESTHESIA EVENT (OUTPATIENT)
Dept: GASTROENTEROLOGY | Facility: HOSPITAL | Age: 64
End: 2024-01-12
Payer: MEDICARE

## 2024-01-12 NOTE — ANESTHESIA PREPROCEDURE EVALUATION
Anesthesia Evaluation     NPO Solid Status: > 8 hours  NPO Liquid Status: > 2 hours           Airway   Mallampati: I  TM distance: >3 FB  Neck ROM: full  Possible difficult intubation and Large neck circumference  Comment: beard  Dental    (+) edentulous    Pulmonary    (+) a smoker Current, Smoked day of surgery, COPD,sleep apnea, decreased breath sounds    ROS comment: Patient completed albuterol nebulizer at home   Cardiovascular - normal exam    (+) PVD (venous insufficiency), hyperlipidemia      Neuro/Psych  (+) psychiatric history Anxiety  GI/Hepatic/Renal/Endo    (+) morbid obesity    Musculoskeletal     Abdominal   (+) obese   Substance History      OB/GYN          Other        ROS/Med Hx Other: History of UVULOPALATOPHARYNGOPLASTY and nasal septum surgery     Patient stated that he had passed out when coughing. Patient stated in the past he has had stress test and echo and everything was normal. Patient stated PCP was not concerned and thought it was a vagal response.     Had water at 0700- can go at 0900              Anesthesia Plan    ASA 3     general   total IV anesthesia  (Total IV Anesthesia    Patient understands anesthesia not responsible for dental damage.  )  intravenous induction     Anesthetic plan, risks, benefits, and alternatives have been provided, discussed and informed consent has been obtained with: patient.    Plan discussed with CRNA.      CODE STATUS:

## 2024-01-15 ENCOUNTER — ANESTHESIA (OUTPATIENT)
Dept: GASTROENTEROLOGY | Facility: HOSPITAL | Age: 64
End: 2024-01-15
Payer: MEDICARE

## 2024-01-15 ENCOUNTER — TELEPHONE (OUTPATIENT)
Dept: SURGERY | Facility: CLINIC | Age: 64
End: 2024-01-15
Payer: MEDICARE

## 2024-02-08 ENCOUNTER — TELEPHONE (OUTPATIENT)
Dept: FAMILY MEDICINE CLINIC | Age: 64
End: 2024-02-08
Payer: MEDICARE

## 2024-02-08 NOTE — TELEPHONE ENCOUNTER
----- Message from Vianca Prado LPN sent at 8/18/2023 12:06 PM EDT -----  Regarding: CT SCAN  CT SCAN RESULTS 08/10/23;   THE PNEUMONIA HAS RESOLVED BUT THERE A RE A FEW SMALL SPOTS IN THE TOP OF THE RIGHT LUNG WHICH ARE PROBABLY HARMLESS BUT REPEAT THIS SAME SCAN IN SIX MONTHS.

## 2024-03-27 ENCOUNTER — TELEPHONE (OUTPATIENT)
Dept: FAMILY MEDICINE CLINIC | Age: 64
End: 2024-03-27
Payer: MEDICARE

## 2024-04-03 DIAGNOSIS — F34.1 DYSTHYMIC DISORDER: ICD-10-CM

## 2024-04-03 DIAGNOSIS — J43.8 OTHER EMPHYSEMA: ICD-10-CM

## 2024-04-03 RX ORDER — ESCITALOPRAM OXALATE 20 MG/1
20 TABLET ORAL DAILY
Qty: 90 TABLET | Refills: 3 | Status: SHIPPED | OUTPATIENT
Start: 2024-04-03

## 2024-04-03 RX ORDER — HYDROXYZINE HYDROCHLORIDE 25 MG/1
50 TABLET, FILM COATED ORAL
Qty: 180 TABLET | Refills: 1 | Status: SHIPPED | OUTPATIENT
Start: 2024-04-03

## 2024-04-03 RX ORDER — ALBUTEROL SULFATE 0.63 MG/3ML
1 SOLUTION RESPIRATORY (INHALATION) EVERY 4 HOURS PRN
Qty: 1080 ML | Refills: 1 | Status: SHIPPED | OUTPATIENT
Start: 2024-04-03

## 2024-04-03 NOTE — TELEPHONE ENCOUNTER
Caller: ABHILASHCORNELIA    Relationship: Emergency Contact    Best call back number: [phone]     Requested Prescriptions:   Requested Prescriptions     Pending Prescriptions Disp Refills    albuterol (ACCUNEB) 0.63 MG/3ML nebulizer solution 360 each 1     Sig: Take 3 mL by nebulization Every 4 (Four) Hours As Needed for Wheezing.    hydrOXYzine (ATARAX) 25 MG tablet 180 tablet 1     Sig: Take 2 tablets by mouth every night at bedtime.    escitalopram (Lexapro) 10 MG tablet 90 tablet 3     Sig: Take 2 tablets by mouth Daily.        Pharmacy where request should be sent: [facility]     Last office visit with prescribing clinician: 9/25/2023   Last telemedicine visit with prescribing clinician: Visit date not found   Next office visit with prescribing clinician: 4/5/2024       Does the patient have less than a 3 day supply:  [x] Yes  [] No      Earnest Veliz Rep   04/03/24 09:50 EDT

## 2024-04-05 ENCOUNTER — OFFICE VISIT (OUTPATIENT)
Dept: FAMILY MEDICINE CLINIC | Age: 64
End: 2024-04-05
Payer: MEDICARE

## 2024-04-05 VITALS
WEIGHT: 315 LBS | SYSTOLIC BLOOD PRESSURE: 142 MMHG | HEART RATE: 76 BPM | OXYGEN SATURATION: 94 % | HEIGHT: 72 IN | BODY MASS INDEX: 42.66 KG/M2 | DIASTOLIC BLOOD PRESSURE: 75 MMHG

## 2024-04-05 DIAGNOSIS — Z23 NEED FOR PNEUMOCOCCAL 20-VALENT CONJUGATE VACCINATION: ICD-10-CM

## 2024-04-05 DIAGNOSIS — J43.8 OTHER EMPHYSEMA: Primary | ICD-10-CM

## 2024-04-05 DIAGNOSIS — Z87.891 PERSONAL HISTORY OF TOBACCO USE, PRESENTING HAZARDS TO HEALTH: ICD-10-CM

## 2024-04-05 DIAGNOSIS — F34.1 DYSTHYMIC DISORDER: ICD-10-CM

## 2024-04-05 DIAGNOSIS — R73.03 PREDIABETES: ICD-10-CM

## 2024-04-05 DIAGNOSIS — E78.00 ELEVATED CHOLESTEROL: ICD-10-CM

## 2024-04-05 DIAGNOSIS — R91.1 LUNG NODULE: ICD-10-CM

## 2024-04-05 PROBLEM — Z00.00 MEDICARE ANNUAL WELLNESS VISIT, SUBSEQUENT: Status: RESOLVED | Noted: 2023-09-25 | Resolved: 2024-04-05

## 2024-04-05 PROBLEM — Z12.11 COLON CANCER SCREENING: Status: RESOLVED | Noted: 2023-07-25 | Resolved: 2024-04-05

## 2024-04-05 RX ORDER — ALBUTEROL SULFATE 90 UG/1
2 AEROSOL, METERED RESPIRATORY (INHALATION) EVERY 4 HOURS PRN
Qty: 20.1 G | Refills: 1 | Status: SHIPPED | OUTPATIENT
Start: 2024-04-05

## 2024-04-05 RX ORDER — ESCITALOPRAM OXALATE 20 MG/1
20 TABLET ORAL DAILY
Qty: 90 TABLET | Refills: 3 | Status: SHIPPED | OUTPATIENT
Start: 2024-04-05

## 2024-04-05 RX ORDER — HYDROXYZINE HYDROCHLORIDE 25 MG/1
50 TABLET, FILM COATED ORAL
Qty: 180 TABLET | Refills: 1 | Status: SHIPPED | OUTPATIENT
Start: 2024-04-05

## 2024-04-05 NOTE — PROGRESS NOTES
Chief Complaint  Hyperlipidemia (6 months) and Emphysema    Subjective          Rusty Benitez presents to [facility]  History of Present Illness  --LAST HGA1C WAS 6.2 % AND LIPIDS WEE OK WITH DIETARY MEASURES ALONE  --DEPRESSION IS DOING WELL WITH THE SSRI AND VISTARIL  --BREATHING IS STABLE ON CURRENT INHALED MEDS  --DUE FOR A REPEAT CHEST CT RELATED TO THE CODY NODULE        Allergies   Allergen Reactions    Latex Rash        Health Maintenance Due   Topic Date Due    COLORECTAL CANCER SCREENING  Never done    COVID-19 Vaccine (3 - 2023-24 season) 09/01/2023    BMI FOLLOWUP  01/23/2024        Current Outpatient Medications on File Prior to Visit   Medication Sig    albuterol (ACCUNEB) 0.63 MG/3ML nebulizer solution Inhale 1 vial by nebulization Every 4 (Four) Hours As Needed for Wheezing.    escitalopram (Lexapro) 20 MG tablet Take 1 tablet by mouth Daily.    hydrOXYzine (ATARAX) 25 MG tablet Take 2 tablets by mouth every night at bedtime.    ibuprofen (ADVIL,MOTRIN) 200 MG tablet Take 1 tablet by mouth Every 6 (Six) Hours As Needed for Mild Pain.    [DISCONTINUED] albuterol sulfate  (90 Base) MCG/ACT inhaler INHALE 2 PUFFS BY MOUTH EVERY 4 HOURS AS NEEDED FOR WHEEZING     No current facility-administered medications on file prior to visit.       Immunization History   Administered Date(s) Administered    COVID-19 (PFIZER) Purple Cap Monovalent 04/01/2021, 04/29/2021    Fluzone (or Fluarix & Flulaval for VFC) >6mos 01/03/2023, 10/05/2023    Pneumococcal Conjugate 20-Valent (PCV20) 04/05/2024       Review of Systems   Constitutional:  Negative for activity change, appetite change, chills, fatigue and fever.   HENT:  Negative for congestion, ear pain, rhinorrhea and sore throat.    Respiratory:  Negative for cough and shortness of breath.    Cardiovascular:  Negative for chest pain, palpitations and leg swelling.   Gastrointestinal:  Negative for abdominal pain,  "constipation, diarrhea, nausea and vomiting.   Musculoskeletal:  Negative for arthralgias and myalgias.   Neurological:  Negative for headache.        Objective     /75 (BP Location: Left arm, Patient Position: Sitting)   Pulse 76   Ht 182.9 cm (72\")   Wt (!) 158 kg (348 lb)   SpO2 94% Comment: on room air  BMI 47.20 kg/m²       Physical Exam  Vitals and nursing note reviewed.   Constitutional:       General: He is not in acute distress.     Appearance: Normal appearance.   Cardiovascular:      Rate and Rhythm: Normal rate and regular rhythm.      Heart sounds: Normal heart sounds. No murmur heard.  Pulmonary:      Effort: Pulmonary effort is normal.      Breath sounds: Normal breath sounds.   Abdominal:      Palpations: Abdomen is soft.      Tenderness: There is no abdominal tenderness.   Musculoskeletal:      Cervical back: Neck supple.      Right lower leg: No edema.      Left lower leg: No edema.   Lymphadenopathy:      Cervical: No cervical adenopathy.   Neurological:      General: No focal deficit present.      Mental Status: He is alert.      Cranial Nerves: No cranial nerve deficit.      Coordination: Coordination normal.      Gait: Gait normal.   Psychiatric:         Mood and Affect: Mood normal.         Behavior: Behavior normal.         Result Review :                             Assessment and Plan      Diagnoses and all orders for this visit:    1. Other emphysema (Primary)  Assessment & Plan:  COPD is stable.    Plan:  Continue same medication/s without change.    Discussed medication dosage, use, side effects, and goals of treatment in detail.  .    Patient Treatment Goals:   symptom prevention    Followup in 6 months.      Orders:  -     albuterol sulfate  (90 Base) MCG/ACT inhaler; Inhale 2 puffs Every 4 (Four) Hours As Needed for Shortness of Air.  Dispense: 20.1 g; Refill: 1    2. Dysthymic disorder  Assessment & Plan:  Patient's depression is a single episode that is mild " without psychosis. Depression is in partial remission and stable.    Plan:   Continue current medication therapy     Followup in 6 months.     Orders:  -     escitalopram (Lexapro) 20 MG tablet; Take 1 tablet by mouth Daily.  Dispense: 90 tablet; Refill: 3  -     hydrOXYzine (ATARAX) 25 MG tablet; Take 2 tablets by mouth every night at bedtime.  Dispense: 180 tablet; Refill: 1    3. Need for pneumococcal 20-valent conjugate vaccination  -     Pneumococcal Conjugate Vaccine 20-Valent (PCV20)    4. Elevated cholesterol  Assessment & Plan:   Lipid abnormalities are stable    Plan:  Continue same medication/s without change.      Discussed medication dosage, use, side effects, and goals of treatment in detail.    Counseled patient on lifestyle modifications to help control hyperlipidemia.     Patient Treatment Goals:   LDL goal is less than 70    Followup in 6 months.      5. Prediabetes  Assessment & Plan:  IMPROVED WITH CURRENT TREATMENT, CONTINUE SAME, WILL REEVALUATE AT NEXT VISIT       6. Lung nodule (4 mm CODY per CT in 2023)  -     CT Chest Without Contrast; Future            Follow Up     Return in about 6 months (around 10/5/2024).    Patient was given instructions and counseling regarding his condition or for health maintenance advice. Please see specific information pulled into the AVS if appropriate.

## 2024-04-05 NOTE — ASSESSMENT & PLAN NOTE
COPD is stable.    Plan:  Continue same medication/s without change.    Discussed medication dosage, use, side effects, and goals of treatment in detail.  .    Patient Treatment Goals:   symptom prevention    Followup in 6 months.

## 2024-04-15 ENCOUNTER — HOSPITAL ENCOUNTER (OUTPATIENT)
Dept: CT IMAGING | Facility: HOSPITAL | Age: 64
Discharge: HOME | End: 2024-04-15
Admitting: FAMILY MEDICINE
Payer: MEDICARE

## 2024-04-15 DIAGNOSIS — R91.1 LUNG NODULE: ICD-10-CM

## 2024-04-15 PROCEDURE — 71250 CT THORAX DX C-: CPT

## 2024-04-15 NOTE — PRE-PROCEDURE INSTRUCTIONS
Reminded of arrival time at 0700  , Entrance C of the [facility] hospital. Instructed to bring or have a  over the age of 18 set up to drive you home the day of procedure.    Instructed on clear liquid diet the day before, nothing red or purple. Call with any questions about the prep or if in need of the prep.  Reminded them not to eat or drink anything am of procedure unless its a sip of water with medications.  Hold ibuprofen am of procedure.

## 2024-04-22 ENCOUNTER — HOSPITAL ENCOUNTER (OUTPATIENT)
Facility: HOSPITAL | Age: 64
Setting detail: HOSPITAL OUTPATIENT SURGERY
Discharge: HOME | End: 2024-04-22
Attending: SURGERY | Admitting: SURGERY
Payer: MEDICARE

## 2024-04-22 VITALS
BODY MASS INDEX: 42.66 KG/M2 | TEMPERATURE: 97.1 F | WEIGHT: 315 LBS | SYSTOLIC BLOOD PRESSURE: 126 MMHG | RESPIRATION RATE: 18 BRPM | HEIGHT: 72 IN | HEART RATE: 72 BPM | OXYGEN SATURATION: 98 % | DIASTOLIC BLOOD PRESSURE: 75 MMHG

## 2024-04-22 DIAGNOSIS — Z12.11 COLON CANCER SCREENING: ICD-10-CM

## 2024-04-22 PROCEDURE — 88305 TISSUE EXAM BY PATHOLOGIST: CPT | Performed by: SURGERY

## 2024-04-22 PROCEDURE — 25810000003 LACTATED RINGERS PER 1000 ML: Performed by: ANESTHESIOLOGY

## 2024-04-22 PROCEDURE — 25010000002 PROPOFOL 10 MG/ML EMULSION: Performed by: NURSE ANESTHETIST, CERTIFIED REGISTERED

## 2024-04-22 RX ORDER — ONDANSETRON 4 MG/1
4 TABLET, ORALLY DISINTEGRATING ORAL ONCE AS NEEDED
Status: DISCONTINUED | OUTPATIENT
Start: 2024-04-22 | End: 2024-04-22 | Stop reason: HOSPADM

## 2024-04-22 RX ORDER — PROPOFOL 10 MG/ML
VIAL (ML) INTRAVENOUS AS NEEDED
Status: DISCONTINUED | OUTPATIENT
Start: 2024-04-22 | End: 2024-04-22 | Stop reason: SURG

## 2024-04-22 RX ORDER — LIDOCAINE HYDROCHLORIDE 20 MG/ML
INJECTION, SOLUTION EPIDURAL; INFILTRATION; INTRACAUDAL; PERINEURAL AS NEEDED
Status: DISCONTINUED | OUTPATIENT
Start: 2024-04-22 | End: 2024-04-22 | Stop reason: SURG

## 2024-04-22 RX ORDER — SODIUM CHLORIDE, SODIUM LACTATE, POTASSIUM CHLORIDE, CALCIUM CHLORIDE 600; 310; 30; 20 MG/100ML; MG/100ML; MG/100ML; MG/100ML
30 INJECTION, SOLUTION INTRAVENOUS CONTINUOUS
Status: DISCONTINUED | OUTPATIENT
Start: 2024-04-22 | End: 2024-04-22 | Stop reason: HOSPADM

## 2024-04-22 RX ORDER — ONDANSETRON 2 MG/ML
4 INJECTION INTRAMUSCULAR; INTRAVENOUS ONCE AS NEEDED
Status: DISCONTINUED | OUTPATIENT
Start: 2024-04-22 | End: 2024-04-22 | Stop reason: HOSPADM

## 2024-04-22 RX ADMIN — PROPOFOL 50 MG: 10 INJECTION, EMULSION INTRAVENOUS at 09:03

## 2024-04-22 RX ADMIN — LIDOCAINE HYDROCHLORIDE 100 MG: 20 INJECTION, SOLUTION EPIDURAL; INFILTRATION; INTRACAUDAL; PERINEURAL at 09:03

## 2024-04-22 RX ADMIN — SODIUM CHLORIDE, POTASSIUM CHLORIDE, SODIUM LACTATE AND CALCIUM CHLORIDE 30 ML/HR: 600; 310; 30; 20 INJECTION, SOLUTION INTRAVENOUS at 08:23

## 2024-04-22 RX ADMIN — PROPOFOL 150 MCG/KG/MIN: 10 INJECTION, EMULSION INTRAVENOUS at 09:03

## 2024-04-22 NOTE — ANESTHESIA POSTPROCEDURE EVALUATION
Patient: Rusty Benitez    Procedure Summary       Date: 04/22/24 Room / Location: [facility] ENDOSCOPY 3 / [facility] ENDOSCOPY    Anesthesia Start: 0902 Anesthesia Stop: 0926    Procedure: COLONOSCOPY WITH HOT SNARE POLYPECTOMY AND BIOPSIES Diagnosis:       Colon cancer screening      (Colon cancer screening [Z12.11])    Surgeons: Bola Arevalo MD Provider: Joaquim Gordillo CRNA    Anesthesia Type: general ASA Status: 3            Anesthesia Type: general    Vitals  Vitals Value Taken Time   /75 04/22/24 0942   Temp 36.2 °C (97.1 °F) 04/22/24 0942   Pulse 71 04/22/24 0944   Resp 18 04/22/24 0942   SpO2 97 % 04/22/24 0944   Vitals shown include unfiled device data.        Post Anesthesia Care and Evaluation    Patient location during evaluation: bedside  Patient participation: complete - patient participated  Level of consciousness: awake  Pain management: adequate    Airway patency: patent  Anesthetic complications: No anesthetic complications  PONV Status: controlled  Cardiovascular status: acceptable and stable  Respiratory status: acceptable

## 2024-04-22 NOTE — H&P
[facility]   HISTORY AND PHYSICAL    Patient Name: Rusty Benitez  : 1960  MRN: [id number]  Primary Care Physician:  Franklin Ruth MD  Date of admission: 2024    Subjective   Subjective     Chief Complaint: Colon cancer screening    HPI:    Rusty Benitez is a 63 y.o. male who presents for colon cancer screening.    Review of Systems   Respiratory:  Negative for shortness of breath.    Cardiovascular:  Negative for chest pain.       Personal History     Past Medical History:   Diagnosis Date    Anxiety     COPD (chronic obstructive pulmonary disease)     Depression     Obesity     Sleep apnea        Past Surgical History:   Procedure Laterality Date    CATARACT EXTRACTION Bilateral     CHOLECYSTECTOMY      KNEE ARTHROSCOPY Right     NASAL SEPTUM SURGERY      TONSILLECTOMY AND ADENOIDECTOMY      UVULOPALATOPHARYNGOPLASTY         Family History: family history is not on file. Otherwise pertinent FHx was reviewed and not pertinent to current issue.    Social History:  reports that he has been smoking cigarettes. He has a 21 pack-year smoking history. He has been exposed to tobacco smoke. He has never used smokeless tobacco. He reports current alcohol use of about 1.0 standard drink of alcohol per week. Drug use questions deferred to the physician.    Home Medications:  albuterol, albuterol sulfate HFA, escitalopram, hydrOXYzine, and ibuprofen    Allergies:  Allergies   Allergen Reactions    Latex Rash       Objective    Objective     Vitals:   Temp:  [97.4 °F (36.3 °C)] 97.4 °F (36.3 °C)  Heart Rate:  [69] 69  Resp:  [20] 20  BP: (139)/(71) 139/71    Physical Exam  HENT:      Head: Normocephalic.   Cardiovascular:      Rate and Rhythm: Normal rate.   Pulmonary:      Effort: Pulmonary effort is normal.   Abdominal:      Palpations: Abdomen is soft.   Musculoskeletal:         General: Normal range of motion.      Cervical back: Normal range of motion.   Skin:     General: Skin is warm.    Neurological:      General: No focal deficit present.      Mental Status: He is alert.   Psychiatric:         Mood and Affect: Mood normal.         Result Review    Result Review:  I have personally reviewed the results from the time of this admission to 4/22/2024 08:33 EDT and agree with these findings:  []  Laboratory  []  Microbiology  []  Radiology  []  EKG/Telemetry   []  Cardiology/Vascular   []  Pathology  []  Old records  []  Other:  Most notable findings include:     Assessment & Plan   Assessment / Plan     Brief Patient Summary:  Rusty Benitez is a 63 y.o. male who presents for colon cancer screening.    Active Hospital Problems:  Active Hospital Problems    Diagnosis     **Colon cancer screening        Plan:   We will proceed with a colonoscopy risk benefits and alternatives were explained.    DVT prophylaxis:  No DVT prophylaxis order currently exists.        CODE STATUS:         Admission Status:  I believe this patient meets outpatient status.    Electronically signed by Bola Arevalo MD, 04/22/24, 8:31 AM EDT.

## 2024-04-23 LAB
CYTO UR: NORMAL
LAB AP CASE REPORT: NORMAL
LAB AP CLINICAL INFORMATION: NORMAL
PATH REPORT.FINAL DX SPEC: NORMAL
PATH REPORT.GROSS SPEC: NORMAL

## 2024-10-18 ENCOUNTER — OFFICE VISIT (OUTPATIENT)
Dept: FAMILY MEDICINE CLINIC | Age: 64
End: 2024-10-18
Payer: MEDICARE

## 2024-10-18 VITALS
HEART RATE: 65 BPM | HEIGHT: 72 IN | OXYGEN SATURATION: 98 % | BODY MASS INDEX: 42.66 KG/M2 | SYSTOLIC BLOOD PRESSURE: 121 MMHG | WEIGHT: 315 LBS | DIASTOLIC BLOOD PRESSURE: 57 MMHG | TEMPERATURE: 97.5 F

## 2024-10-18 DIAGNOSIS — E78.00 ELEVATED CHOLESTEROL: ICD-10-CM

## 2024-10-18 DIAGNOSIS — J43.8 OTHER EMPHYSEMA: ICD-10-CM

## 2024-10-18 DIAGNOSIS — Z00.00 MEDICARE ANNUAL WELLNESS VISIT, SUBSEQUENT: Primary | ICD-10-CM

## 2024-10-18 DIAGNOSIS — Z23 IMMUNIZATION DUE: ICD-10-CM

## 2024-10-18 DIAGNOSIS — F34.1 DYSTHYMIC DISORDER: ICD-10-CM

## 2024-10-18 DIAGNOSIS — R73.03 PREDIABETES: ICD-10-CM

## 2024-10-18 DIAGNOSIS — Z12.5 SCREENING FOR PROSTATE CANCER: ICD-10-CM

## 2024-10-18 PROBLEM — Z86.0101 HISTORY OF ADENOMATOUS AND SERRATED COLON POLYPS: Status: ACTIVE | Noted: 2024-10-18

## 2024-10-18 PROBLEM — Z86.0100 HISTORY OF COLON POLYPS: Status: ACTIVE | Noted: 2024-10-18

## 2024-10-18 PROBLEM — Z12.11 COLON CANCER SCREENING: Status: RESOLVED | Noted: 2023-07-25 | Resolved: 2024-10-18

## 2024-10-18 RX ORDER — ALBUTEROL SULFATE 90 UG/1
2 INHALANT RESPIRATORY (INHALATION) EVERY 4 HOURS PRN
Qty: 20.1 G | Refills: 3 | Status: SHIPPED | OUTPATIENT
Start: 2024-10-18

## 2024-10-18 RX ORDER — ESCITALOPRAM OXALATE 20 MG/1
20 TABLET ORAL DAILY
Qty: 90 TABLET | Refills: 3 | Status: SHIPPED | OUTPATIENT
Start: 2024-10-18

## 2024-10-18 RX ORDER — HYDROXYZINE HYDROCHLORIDE 25 MG/1
50 TABLET, FILM COATED ORAL
Qty: 180 TABLET | Refills: 1 | Status: SHIPPED | OUTPATIENT
Start: 2024-10-18

## 2024-10-18 RX ORDER — ALBUTEROL SULFATE 90 UG/1
2 INHALANT RESPIRATORY (INHALATION) EVERY 4 HOURS PRN
Qty: 20.1 G | Refills: 3 | Status: SHIPPED | OUTPATIENT
Start: 2024-10-18 | End: 2024-10-18

## 2024-10-18 RX ORDER — ESCITALOPRAM OXALATE 20 MG/1
20 TABLET ORAL DAILY
Qty: 90 TABLET | Refills: 3 | Status: SHIPPED | OUTPATIENT
Start: 2024-10-18 | End: 2024-10-18

## 2024-10-18 RX ORDER — HYDROXYZINE HYDROCHLORIDE 25 MG/1
50 TABLET, FILM COATED ORAL
Qty: 180 TABLET | Refills: 1 | Status: SHIPPED | OUTPATIENT
Start: 2024-10-18 | End: 2024-10-18

## 2024-10-18 RX ORDER — ALBUTEROL SULFATE 0.63 MG/3ML
1 SOLUTION RESPIRATORY (INHALATION) EVERY 4 HOURS PRN
Qty: 1080 ML | Refills: 3 | Status: SHIPPED | OUTPATIENT
Start: 2024-10-18 | End: 2024-10-18

## 2024-10-18 RX ORDER — ALBUTEROL SULFATE 0.63 MG/3ML
1 SOLUTION RESPIRATORY (INHALATION) EVERY 4 HOURS PRN
Qty: 1080 ML | Refills: 3 | Status: SHIPPED | OUTPATIENT
Start: 2024-10-18

## 2024-10-18 NOTE — PROGRESS NOTES
Subjective   The ABCs of the Annual Wellness Visit  Medicare Wellness Visit      Rusty Benitez is a 63 y.o. patient who presents for a Medicare Wellness Visit.    The following portions of the patient's history were reviewed and   updated as appropriate: allergies, current medications, past family history, past medical history, past social history, past surgical history, and problem list.    Compared to one year ago, the patient's physical   health is the same.  Compared to one year ago, the patient's mental   health is the same.    Recent Hospitalizations:  He was not admitted to the hospital during the last year.     Current Medical Providers:  Patient Care Team:  Franklin Ruth MD as PCP - General (Family Medicine)    Outpatient Medications Prior to Visit   Medication Sig Dispense Refill    ibuprofen (ADVIL,MOTRIN) 200 MG tablet Take 1 tablet by mouth Every 6 (Six) Hours As Needed for Mild Pain.      albuterol (ACCUNEB) 0.63 MG/3ML nebulizer solution Inhale 1 vial by nebulization Every 4 (Four) Hours As Needed for Wheezing. 1080 mL 1    albuterol sulfate  (90 Base) MCG/ACT inhaler Inhale 2 puffs Every 4 (Four) Hours As Needed for Shortness of Air. 20.1 g 1    escitalopram (Lexapro) 20 MG tablet Take 1 tablet by mouth Daily. 90 tablet 3    hydrOXYzine (ATARAX) 25 MG tablet Take 2 tablets by mouth every night at bedtime. 180 tablet 1    escitalopram (Lexapro) 20 MG tablet Take 1 tablet by mouth Daily. (Patient not taking: Reported on 10/18/2024) 90 tablet 3    hydrOXYzine (ATARAX) 25 MG tablet Take 2 tablets by mouth every night at bedtime. (Patient not taking: Reported on 10/18/2024) 180 tablet 1     No facility-administered medications prior to visit.     No opioid medication identified on active medication list. I have reviewed chart for other potential  high risk medication/s and harmful drug interactions in the elderly.      Aspirin is not on active medication list.  Aspirin use is not  "indicated based on review of current medical condition/s. Risk of harm outweighs potential benefits.  .    Patient Active Problem List   Diagnosis    Elevated cholesterol    Dysthymic disorder    Other emphysema    Cigarette smoker    Obstructive sleep apnea (CPAP)     Venous insufficiency    Prediabetes    Lung nodule (4 mm CODY per CT in 2023)    Medicare annual wellness visit, subsequent    History of colon polyps (next scope due in 2029)     Advance Care Planning Advance Directive is not on file.  ACP discussion was held with the patient during this visit. Patient does not have an advance directive, information provided.            Objective   Vitals:    10/18/24 1614   BP: 121/57   BP Location: Right arm   Patient Position: Sitting   Cuff Size: Large Adult   Pulse: 65   Temp: 97.5 °F (36.4 °C)   TempSrc: Oral   SpO2: 98%   Weight: (!) 156 kg (343 lb 12.8 oz)   Height: 182.9 cm (72\")   PainSc:   4       Estimated body mass index is 46.63 kg/m² as calculated from the following:    Height as of this encounter: 182.9 cm (72\").    Weight as of this encounter: 156 kg (343 lb 12.8 oz).    Class 3 Severe Obesity (BMI >=40). Obesity-related health conditions include the following: obstructive sleep apnea. Obesity is unchanged. BMI is is above average; BMI management plan is completed. We discussed portion control and increasing exercise.       Does the patient have evidence of cognitive impairment? No                                                                                                Health  Risk Assessment    Smoking Status:  Social History     Tobacco Use   Smoking Status Every Day    Current packs/day: 0.50    Average packs/day: 0.5 packs/day for 42.0 years (21.0 ttl pk-yrs)    Types: Cigarettes    Passive exposure: Current   Smokeless Tobacco Never     Alcohol Consumption:  Social History     Substance and Sexual Activity   Alcohol Use Yes    Alcohol/week: 1.0 standard drink of alcohol    Types: 1 Cans of " beer per week       Fall Risk Screen  STEADI Fall Risk Assessment was completed, and patient is at LOW risk for falls.Assessment completed on:10/18/2024    Depression Screening:      10/18/2024     4:15 PM   PHQ-2/PHQ-9 Depression Screening   Little interest or pleasure in doing things Not at all   Feeling down, depressed, or hopeless Not at all     Health Habits and Functional and Cognitive Screening:      10/18/2024     4:00 PM   Functional & Cognitive Status   Do you have difficulty preparing food and eating? No   Do you have difficulty bathing yourself, getting dressed or grooming yourself? No   Do you have difficulty using the toilet? No   Do you have difficulty moving around from place to place? Yes   Do you have trouble with steps or getting out of a bed or a chair? Yes   Current Diet Other   Dental Exam Other        Dental Exam Comment no teeth   Eye Exam Not up to date   Exercise (times per week) 0 times per week   Current Exercises Include No Regular Exercise   Do you need help using the phone?  No   Are you deaf or do you have serious difficulty hearing?  Yes   Do you need help to go to places out of walking distance? Yes   Do you need help shopping? Yes   Do you need help preparing meals?  Yes   Do you need help with housework?  Yes   Do you need help with laundry? Yes   Do you need help taking your medications? No   Do you need help managing money? Yes   Do you ever drive or ride in a car without wearing a seat belt? No   Have you felt unusual stress, anger or loneliness in the last month? Yes   Who do you live with? Spouse   If you need help, do you have trouble finding someone available to you? No   Have you been bothered in the last four weeks by sexual problems? No   Do you have difficulty concentrating, remembering or making decisions? Yes           Age-appropriate Screening Schedule:  Refer to the list below for future screening recommendations based on patient's age, sex and/or medical  conditions. Orders for these recommended tests are listed in the plan section. The patient has been provided with a written plan.    Health Maintenance List  Health Maintenance   Topic Date Due    BMI FOLLOWUP  01/23/2024    LIPID PANEL  11/07/2024    TDAP/TD VACCINES (1 - Tdap) 03/25/2025 (Originally 12/3/1978)    ZOSTER VACCINE (1 of 2) 03/25/2025 (Originally 12/3/2009)    LUNG CANCER SCREENING  04/15/2025    ANNUAL WELLNESS VISIT  10/18/2025    COLORECTAL CANCER SCREENING  04/22/2029    HEPATITIS C SCREENING  Completed    COVID-19 Vaccine  Completed    Pneumococcal Vaccine 0-64  Completed    INFLUENZA VACCINE  Completed                                                                                                                                                CMS Preventative Services Quick Reference  Risk Factors Identified During Encounter  Tobacco Use/Dependance Risk (use dotphrase .tobaccocessation for documentation)    The above risks/problems have been discussed with the patient.  Pertinent information has been shared with the patient in the After Visit Summary.  An After Visit Summary and PPPS were made available to the patient.    Follow Up:   Next Medicare Wellness visit to be scheduled in 1 year.         Additional E&M Note during same encounter follows:  Patient has additional, significant, and separately identifiable condition(s)/problem(s) that require work above and beyond the Medicare Wellness Visit     Chief Complaint  Medicare Wellness-subsequent    Subjective   --THE CHRONIC ANXIETY / DEPRESSION IS DOING WELL WITH THE SSRI AND PRN VISTARIL  --BREATHING IS STABLE WITH CURRENT INHALED MEDS, CONTINUES TO SMOKE  --HFGA1C WAS 6.2 % AND LIPIDS WERE OK WITH DIETARY MEASURES ALONE      Jimmie is also being seen today for additional medical problem/s.    Review of Systems   Constitutional:  Negative for activity change, appetite change, chills, fatigue and fever.   HENT:  Negative for congestion, ear  "pain, hearing loss, rhinorrhea and sore throat.    Eyes:  Negative for discharge and visual disturbance.   Respiratory:  Negative for cough and shortness of breath.    Cardiovascular:  Negative for chest pain, palpitations and leg swelling.   Gastrointestinal:  Negative for abdominal pain, nausea and vomiting.   Genitourinary:  Negative for dysuria and hematuria.   Musculoskeletal:  Negative for arthralgias and myalgias.   Psychiatric/Behavioral:  Negative for dysphoric mood.               Objective   Vital Signs:  /57 (BP Location: Right arm, Patient Position: Sitting, Cuff Size: Large Adult)   Pulse 65   Temp 97.5 °F (36.4 °C) (Oral)   Ht 182.9 cm (72\")   Wt (!) 156 kg (343 lb 12.8 oz)   SpO2 98%   BMI 46.63 kg/m²   Physical Exam  Vitals and nursing note reviewed.   Constitutional:       General: He is not in acute distress.     Appearance: Normal appearance.   HENT:      Right Ear: Tympanic membrane normal.      Left Ear: Tympanic membrane normal.      Mouth/Throat:      Pharynx: Oropharynx is clear.   Eyes:      Conjunctiva/sclera: Conjunctivae normal.   Cardiovascular:      Rate and Rhythm: Normal rate and regular rhythm.      Heart sounds: Normal heart sounds. No murmur heard.  Pulmonary:      Effort: Pulmonary effort is normal.      Breath sounds: Normal breath sounds.   Abdominal:      General: Bowel sounds are normal.      Palpations: Abdomen is soft.      Tenderness: There is no abdominal tenderness.   Musculoskeletal:      Cervical back: Neck supple.      Right lower leg: No edema.      Left lower leg: No edema.   Lymphadenopathy:      Cervical: No cervical adenopathy.   Neurological:      General: No focal deficit present.      Mental Status: He is alert.      Cranial Nerves: No cranial nerve deficit.      Coordination: Coordination normal.      Gait: Gait normal.   Psychiatric:         Mood and Affect: Mood normal.         Behavior: Behavior normal.                 Assessment and Plan "               Immunization due    Dysthymic disorder  Patient's depression is a single episode that is mild without psychosis. Depression is in partial remission and stable.    Plan:   Continue current medication therapy     Followup in 6 months.   Elevated cholesterol   Lipid abnormalities are stable    Plan:  Continue same medication/s without change.      Discussed medication dosage, use, side effects, and goals of treatment in detail.    Counseled patient on lifestyle modifications to help control hyperlipidemia.     Patient Treatment Goals:   LDL goal is less than 70    Followup in 6 months.  Medicare annual wellness visit, subsequent  ADVICE GIVEN RE:  SEATBELT USE, ALCOHOL USE, HEALTHY DIET, ROUTINE EYE AND DENTAL EXAM, ROUTINE VACCINATIONS.    Other emphysema  IMPROVED WITH CURRENT TREATMENT, CONTINUE SAME, WILL REEVALUATE AT NEXT VISIT     Prediabetes  IMPROVED WITH CURRENT TREATMENT, CONTINUE SAME, WILL REEVALUATE AT NEXT VISIT   Screening for prostate cancer      Orders Placed This Encounter   Procedures    Fluzone >6mos ([id number])    COVID-19 (Pfizer) 12yrs+ (COMIRNATY)    Comprehensive Metabolic Panel     Standing Status:   Future     Standing Expiration Date:   10/18/2025     Order Specific Question:   Release to patient     Answer:   Routine Release [[id number]]    Hemoglobin A1c     Standing Status:   Future     Standing Expiration Date:   10/18/2025     Order Specific Question:   Release to patient     Answer:   Routine Release [[id number]]    Lipid Panel     Standing Status:   Future     Standing Expiration Date:   10/18/2025     Order Specific Question:   Release to patient     Answer:   Routine Release [[id number]]    PSA Screen     Standing Status:   Future     Standing Expiration Date:   10/18/2025     Order Specific Question:   Release to patient     Answer:   Routine Release [[id number]]     New Medications Ordered This Visit   Medications    albuterol (ACCUNEB) 0.63 MG/3ML nebulizer  solution     Sig: Inhale 1 vial by nebulization Every 4 (Four) Hours As Needed for Wheezing.     Dispense:  1080 mL     Refill:  3    albuterol sulfate  (90 Base) MCG/ACT inhaler     Sig: Inhale 2 puffs Every 4 (Four) Hours As Needed for Shortness of Air.     Dispense:  20.1 g     Refill:  3    escitalopram (Lexapro) 20 MG tablet     Sig: Take 1 tablet by mouth Daily.     Dispense:  90 tablet     Refill:  3     Pt was increased to a whole 20mg tablet on 05/30/23.    hydrOXYzine (ATARAX) 25 MG tablet     Sig: Take 2 tablets by mouth every night at bedtime.     Dispense:  180 tablet     Refill:  1        Medical Condition Certification     Patient: Rusty Benitez YOB: 1960   Patient Address:   [address] [place] [id number] Patient Phone:   Home Phone [phone]   Mobile [phone]      Provider Name:   Franklin Ruth MD [place] Medical License:  MD,  [id number]   Provider Location:  [facility]  [address] [place] [id number]  Dept: [phone]  Dept Fax: [phone]      Rusty Benitez is a 63 y.o. male requests certification for his medical condition of  SEVERE ANXIETY AND DEPRESSION .    I verify that I have a andreina isaiah healthcare provider- relationship with Rusty Benitez.    I verify that in my professional opinion Rusty Benitez suffers from the condition identified above.    Electronically signed by Franklin Ruth MD  10/18/24, 5:06 PM EDT     Follow Up   Return in about 6 months (around 4/18/2025).  Patient was given instructions and counseling regarding his condition or for health maintenance advice. Please see specific information pulled into the AVS if appropriate.

## 2024-10-21 ENCOUNTER — TELEPHONE (OUTPATIENT)
Dept: FAMILY MEDICINE CLINIC | Age: 64
End: 2024-10-21
Payer: MEDICARE

## 2024-10-21 NOTE — TELEPHONE ENCOUNTER
----- Message from Franklin Ruth sent at 10/18/2024  5:09 PM EDT -----  IN TODAY'S OFFICE NOTE I HAVE CREATED A MARIJUANA CERTIFICATION LETTER.  PLEASE PRINT IT AND CALL THE PATIENT TO PICK IT UP.  THANKS

## 2024-11-07 ENCOUNTER — TELEPHONE (OUTPATIENT)
Dept: FAMILY MEDICINE CLINIC | Age: 64
End: 2024-11-07
Payer: MEDICARE

## 2024-11-07 NOTE — TELEPHONE ENCOUNTER
1st attempt- contacted wife Penny to remind Jimmie of over due fasting lab orders he needs to complete.

## 2025-02-07 PROBLEM — Z00.00 MEDICARE ANNUAL WELLNESS VISIT, SUBSEQUENT: Status: RESOLVED | Noted: 2023-09-25 | Resolved: 2025-02-07

## 2025-02-27 ENCOUNTER — LAB (OUTPATIENT)
Dept: LAB | Facility: HOSPITAL | Age: 65
End: 2025-02-27
Payer: MEDICARE

## 2025-02-27 DIAGNOSIS — E78.00 ELEVATED CHOLESTEROL: ICD-10-CM

## 2025-02-27 DIAGNOSIS — Z12.5 SCREENING FOR PROSTATE CANCER: ICD-10-CM

## 2025-02-27 DIAGNOSIS — R73.03 PREDIABETES: ICD-10-CM

## 2025-02-27 LAB
ALBUMIN SERPL-MCNC: 3.7 G/DL (ref 3.5–5.2)
ALBUMIN/GLOB SERPL: 1.1 G/DL
ALP SERPL-CCNC: 82 U/L (ref 39–117)
ALT SERPL W P-5'-P-CCNC: 13 U/L (ref 1–41)
ANION GAP SERPL CALCULATED.3IONS-SCNC: 12.5 MMOL/L (ref 5–15)
AST SERPL-CCNC: 18 U/L (ref 1–40)
BILIRUB SERPL-MCNC: 0.4 MG/DL (ref 0–1.2)
BUN SERPL-MCNC: 12 MG/DL (ref 8–23)
BUN/CREAT SERPL: 11.5 (ref 7–25)
CALCIUM SPEC-SCNC: 9.7 MG/DL (ref 8.6–10.5)
CHLORIDE SERPL-SCNC: 99 MMOL/L (ref 98–107)
CHOLEST SERPL-MCNC: 193 MG/DL (ref 0–200)
CO2 SERPL-SCNC: 26.5 MMOL/L (ref 22–29)
CREAT SERPL-MCNC: 1.04 MG/DL (ref 0.76–1.27)
EGFRCR SERPLBLD CKD-EPI 2021: 79.7 ML/MIN/1.73
GLOBULIN UR ELPH-MCNC: 3.5 GM/DL
GLUCOSE SERPL-MCNC: 106 MG/DL (ref 65–99)
HBA1C MFR BLD: 6.1 % (ref 4.8–5.6)
HDLC SERPL-MCNC: 37 MG/DL (ref 40–60)
LDLC SERPL CALC-MCNC: 131 MG/DL (ref 0–100)
LDLC/HDLC SERPL: 3.46 {RATIO}
POTASSIUM SERPL-SCNC: 4.6 MMOL/L (ref 3.5–5.2)
PROT SERPL-MCNC: 7.2 G/DL (ref 6–8.5)
PSA SERPL-MCNC: 1.1 NG/ML (ref 0–4)
SODIUM SERPL-SCNC: 138 MMOL/L (ref 136–145)
TRIGL SERPL-MCNC: 139 MG/DL (ref 0–150)
VLDLC SERPL-MCNC: 25 MG/DL (ref 5–40)

## 2025-02-27 PROCEDURE — 80061 LIPID PANEL: CPT

## 2025-02-27 PROCEDURE — 80053 COMPREHEN METABOLIC PANEL: CPT

## 2025-02-27 PROCEDURE — 83036 HEMOGLOBIN GLYCOSYLATED A1C: CPT

## 2025-02-27 PROCEDURE — G0103 PSA SCREENING: HCPCS

## 2025-02-27 PROCEDURE — 36415 COLL VENOUS BLD VENIPUNCTURE: CPT

## 2025-04-10 ENCOUNTER — TELEPHONE (OUTPATIENT)
Dept: FAMILY MEDICINE CLINIC | Age: 65
End: 2025-04-10
Payer: MEDICARE

## 2025-04-10 ENCOUNTER — DOCUMENTATION (OUTPATIENT)
Dept: FAMILY MEDICINE CLINIC | Age: 65
End: 2025-04-10
Payer: MEDICARE

## 2025-04-10 DIAGNOSIS — F17.210 CIGARETTE SMOKER: Primary | ICD-10-CM

## 2025-04-10 NOTE — TELEPHONE ENCOUNTER
----- Message from Vianca BURNETT sent at 8/5/2024  9:27 AM EDT -----  Franklin Ruth MD  4/16/2024  2:41 PM EDT     The tiny spot in the left lung is unchanged. The lungs are otherwise clear. Repeat the same low-dose CT in one year.

## 2025-04-10 NOTE — TELEPHONE ENCOUNTER
Pt informed his repeat low dose CT scan is due.  He said okay to schedule.    He missed his appt with Dr Ruth is February, he said he would call back later to reschedule that.  He has to find transportation.

## 2025-04-23 ENCOUNTER — HOSPITAL ENCOUNTER (OUTPATIENT)
Dept: CT IMAGING | Facility: HOSPITAL | Age: 65
Discharge: HOME | End: 2025-04-23
Admitting: FAMILY MEDICINE
Payer: MEDICARE

## 2025-04-23 DIAGNOSIS — F17.210 CIGARETTE SMOKER: ICD-10-CM

## 2025-04-23 PROCEDURE — 71271 CT THORAX LUNG CANCER SCR C-: CPT

## 2025-04-28 ENCOUNTER — RESULTS FOLLOW-UP (OUTPATIENT)
Dept: FAMILY MEDICINE CLINIC | Age: 65
End: 2025-04-28
Payer: MEDICARE

## 2025-04-28 NOTE — LETTER
Rusty Benitez  [address] [facility] [address]    April 28, 2025     Dear Mr. Benitez:    This CT looks the same as last year. We will plan to repeat this in another year.     Below are the results from your recent visit:    Resulted Orders   CT Chest Low Dose Cancer Screening WO    Narrative    CT CHEST LOW DOSE CANCER SCREENING WO    Date of Exam: 4/23/2025 4:48 PM EDT    Indication: Screen for lung cancer. High risk screening. Current smoker.    Comparison: CT chest 4/15/2024    Technique: Low dose CT imaging of the chest was performed without intravenous contrast enhancement.  Automated exposure control and iterative reconstruction methods were used.      Findings:  Lung window images reveal stable 4 mm noncalcified nodule in the lateral left upper lobe, well seen on series 204 image 60.    1 cm calcified granuloma in the anterior and inferior right lower lobe is stable.    No new or suspicious lung nodule is evident.    Mediastinal windows reveal no mediastinal, hilar, or axillary adenopathy. Faint coronary artery calcifications are evident.    The gallbladder is absent, surgical clips are seen in the gallbladder bed.    Mild degenerative spurring is seen in the thoracic spine.      Impression    Impression:  Low-dose screening CT scan of the chest demonstrating no new or suspicious lung nodule. LDCT chest is recommended in a year.    Recommendation:  Continue annual screening with LDCT    Lung Rads Assessment:  Lung-RADS L2 - Benign appearance or <1% chance of malignancy.        Electronically Signed: Darien Hensley MD    4/27/2025 6:10 PM EDT    Workstation ID: [id number]           If you have any questions or concerns, please don't hesitate to call.         Sincerely,        Franklin Ruth MD

## 2025-06-03 DIAGNOSIS — J43.8 OTHER EMPHYSEMA: ICD-10-CM

## 2025-06-03 RX ORDER — ALBUTEROL SULFATE 0.63 MG/3ML
1 SOLUTION RESPIRATORY (INHALATION) EVERY 4 HOURS PRN
Qty: 1080 ML | Refills: 3 | Status: SHIPPED | OUTPATIENT
Start: 2025-06-03

## 2025-06-03 RX ORDER — ALBUTEROL SULFATE 90 UG/1
2 INHALANT RESPIRATORY (INHALATION) EVERY 4 HOURS PRN
Qty: 20.1 G | Refills: 3 | Status: SHIPPED | OUTPATIENT
Start: 2025-06-03

## 2025-06-03 NOTE — TELEPHONE ENCOUNTER
Caller: ABHILASHCORNELIA    Relationship: Emergency Contact    Best call back number: [phone]     Requested Prescriptions:   Requested Prescriptions     Pending Prescriptions Disp Refills    albuterol (ACCUNEB) 0.63 MG/3ML nebulizer solution 1080 mL 3     Sig: Inhale 1 vial by nebulization Every 4 (Four) Hours As Needed for Wheezing.    albuterol sulfate  (90 Base) MCG/ACT inhaler 20.1 g 3     Sig: Inhale 2 puffs Every 4 (Four) Hours As Needed for Shortness of Air.        Pharmacy where request should be sent: [facility]     Last office visit with prescribing clinician: 10/18/2024   Last telemedicine visit with prescribing clinician: Visit date not found   Next office visit with prescribing clinician: 6/11/2025     Additional details provided by patient:     Does the patient have less than a 3 day supply:  [x] Yes  [] No    Would you like a call back once the refill request has been completed: [] Yes [] No    If the office needs to give you a call back, can they leave a voicemail: [] Yes [] No    Earnest Mancilla   06/03/25 14:34 EDT

## 2025-06-13 ENCOUNTER — OFFICE VISIT (OUTPATIENT)
Dept: FAMILY MEDICINE CLINIC | Age: 65
End: 2025-06-13
Payer: MEDICARE

## 2025-06-13 VITALS
WEIGHT: 315 LBS | SYSTOLIC BLOOD PRESSURE: 137 MMHG | BODY MASS INDEX: 42.66 KG/M2 | HEIGHT: 72 IN | TEMPERATURE: 98.5 F | OXYGEN SATURATION: 96 % | HEART RATE: 89 BPM | DIASTOLIC BLOOD PRESSURE: 82 MMHG

## 2025-06-13 DIAGNOSIS — Z13.6 SCREENING FOR AAA (ABDOMINAL AORTIC ANEURYSM): ICD-10-CM

## 2025-06-13 DIAGNOSIS — R73.03 PREDIABETES: ICD-10-CM

## 2025-06-13 DIAGNOSIS — F34.1 DYSTHYMIC DISORDER: ICD-10-CM

## 2025-06-13 DIAGNOSIS — E78.00 ELEVATED CHOLESTEROL: ICD-10-CM

## 2025-06-13 DIAGNOSIS — J43.8 OTHER EMPHYSEMA: Primary | ICD-10-CM

## 2025-06-13 RX ORDER — GUAIFENESIN 600 MG/1
1200 TABLET, EXTENDED RELEASE ORAL 2 TIMES DAILY
COMMUNITY

## 2025-06-13 RX ORDER — HYDROXYZINE HYDROCHLORIDE 25 MG/1
50 TABLET, FILM COATED ORAL
Qty: 180 TABLET | Refills: 1 | Status: SHIPPED | OUTPATIENT
Start: 2025-06-13

## 2025-06-13 NOTE — PROGRESS NOTES
Chief Complaint  Emphysema (Follow up)    Subjective          Rusty Benitez presents to [facility]  History of Present Illness  --LAST LIPIDS WERE OK AND HGA1C WAS 6/1 % WITH DIETARY MEASURES ALONE  --BREATHING IS STABLE WITH CURRENT INHALED MEDS, CONTINUES TO SMOKE  --THE CHRONIC DEPRESSION / ANXIETY IS DOING WELL CURRENTLY WITH THE HS VISTARIL ONLY, STOPPED THE CELEXA  --DUE FOR AN AAA SCREENING U/S.          Allergies   Allergen Reactions    Latex Rash        Health Maintenance Due   Topic Date Due    TDAP/TD VACCINES (1 - Tdap) Never done    ZOSTER VACCINE (1 of 2) Never done    AAA SCREEN ONCE  Never done    COVID-19 Vaccine (4 - 2024-25 season) 04/18/2025        Current Outpatient Medications on File Prior to Visit   Medication Sig    albuterol (ACCUNEB) 0.63 MG/3ML nebulizer solution Inhale 1 vial by nebulization Every 4 (Four) Hours As Needed for Wheezing.    albuterol sulfate  (90 Base) MCG/ACT inhaler Inhale 2 puffs Every 4 (Four) Hours As Needed for Shortness of Air.    guaiFENesin (MUCINEX) 600 MG 12 hr tablet Take 2 tablets by mouth 2 (Two) Times a Day.    ibuprofen (ADVIL,MOTRIN) 200 MG tablet Take 1 tablet by mouth Every 6 (Six) Hours As Needed for Mild Pain.    [DISCONTINUED] hydrOXYzine (ATARAX) 25 MG tablet Take 2 tablets by mouth every night at bedtime.    [DISCONTINUED] escitalopram (Lexapro) 20 MG tablet Take 1 tablet by mouth Daily. (Patient not taking: Reported on 6/13/2025)     No current facility-administered medications on file prior to visit.       Immunization History   Administered Date(s) Administered    COVID-19 (PFIZER) 12YRS+ (COMIRNATY) 10/18/2024    COVID-19 (PFIZER) Purple Cap Monovalent 04/01/2021, 04/29/2021    Fluzone  >6mos 10/18/2024    Fluzone (or Fluarix & Flulaval for VFC) >6mos 01/03/2023, 10/05/2023    Pneumococcal Conjugate 20-Valent (PCV20) 04/05/2024       Review of Systems   Constitutional:  Negative for activity change,  "appetite change, chills, fatigue and fever.   HENT:  Negative for congestion, ear pain, rhinorrhea and sore throat.    Respiratory:  Negative for cough and shortness of breath.    Cardiovascular:  Negative for chest pain, palpitations and leg swelling.   Gastrointestinal:  Negative for abdominal pain, constipation, diarrhea, nausea and vomiting.   Musculoskeletal:  Negative for arthralgias and myalgias.   Neurological:  Negative for headache.        Objective     /82 (BP Location: Left arm, Patient Position: Sitting)   Pulse 89   Temp 98.5 °F (36.9 °C) (Oral)   Ht 182.9 cm (72\")   Wt (!) 146 kg (322 lb 3.2 oz)   SpO2 96% Comment: on RA  BMI 43.70 kg/m²       Physical Exam  Vitals and nursing note reviewed.   Constitutional:       General: He is not in acute distress.     Appearance: Normal appearance.   Cardiovascular:      Rate and Rhythm: Normal rate and regular rhythm.      Heart sounds: Normal heart sounds. No murmur heard.  Pulmonary:      Effort: Pulmonary effort is normal.      Breath sounds: Normal breath sounds.   Abdominal:      Palpations: Abdomen is soft.      Tenderness: There is no abdominal tenderness.   Musculoskeletal:      Cervical back: Neck supple.      Right lower leg: No edema.      Left lower leg: No edema.   Lymphadenopathy:      Cervical: No cervical adenopathy.   Neurological:      General: No focal deficit present.      Mental Status: He is alert.      Cranial Nerves: No cranial nerve deficit.      Coordination: Coordination normal.      Gait: Gait normal.   Psychiatric:         Mood and Affect: Mood normal.         Behavior: Behavior normal.         Result Review :                             Assessment and Plan      Diagnoses and all orders for this visit:    1. Other emphysema (Primary)  Assessment & Plan:  IMPROVED WITH CURRENT TREATMENT, WILL REEVALUATE AT NEXT VISIT       2. Dysthymic disorder  Assessment & Plan:  IMPROVED WITH CURRENT TREATMENT, WILL REEVALUATE AT NEXT " VISIT     Orders:  -     hydrOXYzine (ATARAX) 25 MG tablet; Take 2 tablets by mouth every night at bedtime.  Dispense: 180 tablet; Refill: 1    3. Elevated cholesterol  Assessment & Plan:   Lipid abnormalities are stable    Plan:  Continue same medication/s without change.      Discussed medication dosage, use, side effects, and goals of treatment in detail.    Counseled patient on lifestyle modifications to help control hyperlipidemia.     Patient Treatment Goals:   LDL goal is less than 70    Followup in 6 months.      4. Prediabetes  Assessment & Plan:  IMPROVED WITH CURRENT TREATMENT, WILL REEVALUATE AT NEXT VISIT       5. Screening for AAA (abdominal aortic aneurysm)  -     Abdominal Aortic Aneurysm Screening Medicare CAR; Future                    Follow Up     Return in about 6 months (around 12/13/2025).    Patient was given instructions and counseling regarding his condition or for health maintenance advice. Please see specific information pulled into the AVS if appropriate.

## 2025-06-18 DIAGNOSIS — Z87.891 HISTORY OF NICOTINE DEPENDENCE: Primary | ICD-10-CM

## 2025-07-23 ENCOUNTER — RESULTS FOLLOW-UP (OUTPATIENT)
Dept: FAMILY MEDICINE CLINIC | Age: 65
End: 2025-07-23
Payer: MEDICARE

## 2025-07-23 ENCOUNTER — HOSPITAL ENCOUNTER (OUTPATIENT)
Dept: ULTRASOUND IMAGING | Facility: HOSPITAL | Age: 65
Discharge: HOME | End: 2025-07-23
Admitting: FAMILY MEDICINE
Payer: MEDICARE

## 2025-07-23 ENCOUNTER — APPOINTMENT (OUTPATIENT)
Dept: CT IMAGING | Facility: HOSPITAL | Age: 65
End: 2025-07-23
Payer: MEDICARE

## 2025-07-23 DIAGNOSIS — Z87.891 HISTORY OF NICOTINE DEPENDENCE: ICD-10-CM

## 2025-07-23 PROCEDURE — 76706 US ABDL AORTA SCREEN AAA: CPT

## 2025-07-27 DIAGNOSIS — I71.43 INFRARENAL ABDOMINAL AORTIC ANEURYSM, WITHOUT RUPTURE: Primary | ICD-10-CM

## 2025-08-18 ENCOUNTER — HOSPITAL ENCOUNTER (OUTPATIENT)
Dept: CT IMAGING | Facility: HOSPITAL | Age: 65
Discharge: HOME | End: 2025-08-18
Admitting: FAMILY MEDICINE
Payer: MEDICARE

## 2025-08-18 DIAGNOSIS — I71.43 INFRARENAL ABDOMINAL AORTIC ANEURYSM, WITHOUT RUPTURE: ICD-10-CM

## 2025-08-18 LAB
CREAT BLDA-MCNC: 1.1 MG/DL (ref 0.6–1.3)
EGFRCR SERPLBLD CKD-EPI 2021: 74.5 ML/MIN/1.73

## 2025-08-18 PROCEDURE — 74177 CT ABD & PELVIS W/CONTRAST: CPT

## 2025-08-18 PROCEDURE — 82565 ASSAY OF CREATININE: CPT

## 2025-08-18 PROCEDURE — 25510000001 IOPAMIDOL PER 1 ML: Performed by: FAMILY MEDICINE

## 2025-08-18 RX ORDER — IOPAMIDOL 755 MG/ML
100 INJECTION, SOLUTION INTRAVASCULAR
Status: COMPLETED | OUTPATIENT
Start: 2025-08-18 | End: 2025-08-18

## 2025-08-18 RX ADMIN — IOPAMIDOL 100 ML: 755 INJECTION, SOLUTION INTRAVENOUS at 09:45

## (undated) DEVICE — STERILE POLYISOPRENE POWDER-FREE SURGICAL GLOVES: Brand: PROTEXIS

## (undated) DEVICE — PAD GRND REM POLYHESIVE A/ DISP

## (undated) DEVICE — THE SINGLE USE ETRAP – POLYP TRAP IS USED FOR SUCTION RETRIEVAL OF ENDOSCOPICALLY REMOVED POLYPS.: Brand: ETRAP

## (undated) DEVICE — CONN JET HYDRA H20 AUXILIARY DISP

## (undated) DEVICE — LINER SURG CANSTR SXN S/RIGD 1500CC

## (undated) DEVICE — Device: Brand: DEFENDO AIR/WATER/SUCTION AND BIOPSY VALVE

## (undated) DEVICE — SOL IRRG H2O PL/BG 1000ML STRL

## (undated) DEVICE — Device

## (undated) DEVICE — SOL IRR NACL 0.9PCT BT 1000ML

## (undated) DEVICE — GOWN,REINFRCE,POLY,SIRUS,BREATH SLV,XXLG: Brand: MEDLINE

## (undated) DEVICE — Device: Brand: DISPOSABLE ELECTROSURGICAL SNARE

## (undated) DEVICE — SINGLE-USE BIOPSY FORCEPS: Brand: RADIAL JAW 4

## (undated) DEVICE — SOLIDIFIER LIQLOC PLS 1500CC BT